# Patient Record
Sex: FEMALE | Race: WHITE | NOT HISPANIC OR LATINO
[De-identification: names, ages, dates, MRNs, and addresses within clinical notes are randomized per-mention and may not be internally consistent; named-entity substitution may affect disease eponyms.]

---

## 2017-01-24 ENCOUNTER — LABORATORY RESULT (OUTPATIENT)
Age: 52
End: 2017-01-24

## 2017-01-24 ENCOUNTER — APPOINTMENT (OUTPATIENT)
Dept: RHEUMATOLOGY | Facility: CLINIC | Age: 52
End: 2017-01-24

## 2017-01-24 VITALS
HEART RATE: 67 BPM | OXYGEN SATURATION: 98 % | BODY MASS INDEX: 21.17 KG/M2 | HEIGHT: 64 IN | WEIGHT: 124 LBS | DIASTOLIC BLOOD PRESSURE: 77 MMHG | SYSTOLIC BLOOD PRESSURE: 126 MMHG

## 2017-02-03 LAB
ALBUMIN SERPL ELPH-MCNC: 4.3 G/DL
ALP BLD-CCNC: 44 U/L
ALT SERPL-CCNC: 15 U/L
ANA PAT FLD IF-IMP: ABNORMAL
ANA SER IF-ACNC: ABNORMAL
ANION GAP SERPL CALC-SCNC: 15 MMOL/L
APPEARANCE: CLEAR
AST SERPL-CCNC: 15 U/L
B2 GLYCOPROT1 IGA SERPL IA-ACNC: <5 SAU
B2 GLYCOPROT1 IGG SER-ACNC: <5 SGU
B2 GLYCOPROT1 IGM SER-ACNC: <5 SMU
BACTERIA: ABNORMAL
BASOPHILS # BLD AUTO: 0.04 K/UL
BASOPHILS NFR BLD AUTO: 0.8 %
BILIRUB SERPL-MCNC: 0.3 MG/DL
BILIRUBIN URINE: NEGATIVE
BLOOD URINE: NEGATIVE
BUN SERPL-MCNC: 13 MG/DL
C3 SERPL-MCNC: 77 MG/DL
C4 SERPL-MCNC: 14 MG/DL
CALCIUM SERPL-MCNC: 9.6 MG/DL
CARDIOLIPIN AB SER IA-ACNC: NEGATIVE
CENTROMERE IGG SER-ACNC: 3.2 AL
CHLORIDE SERPL-SCNC: 103 MMOL/L
CO2 SERPL-SCNC: 24 MMOL/L
COLOR: YELLOW
CONFIRM: 25.1 SEC
CREAT SERPL-MCNC: 0.6 MG/DL
CREAT SPEC-SCNC: 28 MG/DL
CREAT/PROT UR: <0.1 RATIO
DEPRECATED CARDIOLIPIN IGA SER: <5 APL
DRVVT IMM 1:2 NP PPP: NORMAL
DRVVT SCREEN TO CONFIRM RATIO: 0.9 RATIO
DSDNA AB SER-ACNC: <12 IU/ML
ENA RNP AB SER IA-ACNC: 0.3 AL
ENA SCL70 IGG SER IA-ACNC: <0.2 AL
ENA SM AB SER IA-ACNC: <0.2 AL
ENA SS-A AB SER IA-ACNC: <0.2 AL
ENA SS-B AB SER IA-ACNC: <0.2 AL
EOSINOPHIL # BLD AUTO: 0.12 K/UL
EOSINOPHIL NFR BLD AUTO: 2.4 %
GLUCOSE QUALITATIVE U: NORMAL MG/DL
GLUCOSE SERPL-MCNC: 88 MG/DL
HCT VFR BLD CALC: 43.6 %
HGB BLD-MCNC: 13.6 G/DL
HYALINE CASTS: 1 /LPF
IMM GRANULOCYTES NFR BLD AUTO: 0.2 %
KETONES URINE: NEGATIVE
LEUKOCYTE ESTERASE URINE: NEGATIVE
LYMPHOCYTES # BLD AUTO: 1.93 K/UL
LYMPHOCYTES NFR BLD AUTO: 39.4 %
MAN DIFF?: NORMAL
MCHC RBC-ENTMCNC: 29.1 PG
MCHC RBC-ENTMCNC: 31.2 GM/DL
MCV RBC AUTO: 93.2 FL
MICROSCOPIC-UA: NORMAL
MONOCYTES # BLD AUTO: 0.54 K/UL
MONOCYTES NFR BLD AUTO: 11 %
NEUTROPHILS # BLD AUTO: 2.26 K/UL
NEUTROPHILS NFR BLD AUTO: 46.2 %
NITRITE URINE: NEGATIVE
PH URINE: 7
PLATELET # BLD AUTO: 276 K/UL
POTASSIUM SERPL-SCNC: 4.3 MMOL/L
PROT SERPL-MCNC: 6.8 G/DL
PROT UR-MCNC: <4 MG/DL
PROTEIN URINE: NEGATIVE MG/DL
RBC # BLD: 4.68 M/UL
RBC # FLD: 13.4 %
RED BLOOD CELLS URINE: 4 /HPF
RNA POLYMERASE III IGG: <10 U
SCREEN DRVVT: 26.3 SEC
SILICA CLOTTING TIME INTERPRETATION: NORMAL
SILICA CLOTTING TIME S/C: 1.02 RATIO
SODIUM SERPL-SCNC: 142 MMOL/L
SPECIFIC GRAVITY URINE: 1.01
SQUAMOUS EPITHELIAL CELLS: 5 /HPF
THYROGLOB AB SERPL-ACNC: <20 IU/ML
THYROPEROXIDASE AB SERPL IA-ACNC: <10 IU/ML
UROBILINOGEN URINE: NORMAL MG/DL
WBC # FLD AUTO: 4.9 K/UL
WHITE BLOOD CELLS URINE: 1 /HPF

## 2017-05-09 ENCOUNTER — TRANSCRIPTION ENCOUNTER (OUTPATIENT)
Age: 52
End: 2017-05-09

## 2017-07-17 ENCOUNTER — APPOINTMENT (OUTPATIENT)
Dept: DERMATOLOGY | Facility: CLINIC | Age: 52
End: 2017-07-17

## 2017-07-17 VITALS — HEIGHT: 64 IN | SYSTOLIC BLOOD PRESSURE: 108 MMHG | DIASTOLIC BLOOD PRESSURE: 60 MMHG

## 2017-07-17 DIAGNOSIS — I78.1 NEVUS, NON-NEOPLASTIC: ICD-10-CM

## 2017-07-17 DIAGNOSIS — Z87.442 PERSONAL HISTORY OF URINARY CALCULI: ICD-10-CM

## 2017-08-11 ENCOUNTER — APPOINTMENT (OUTPATIENT)
Dept: RHEUMATOLOGY | Facility: CLINIC | Age: 52
End: 2017-08-11
Payer: COMMERCIAL

## 2017-08-11 PROCEDURE — 99214 OFFICE O/P EST MOD 30 MIN: CPT

## 2017-08-16 ENCOUNTER — OUTPATIENT (OUTPATIENT)
Dept: OUTPATIENT SERVICES | Facility: HOSPITAL | Age: 52
LOS: 1 days | End: 2017-08-16
Payer: COMMERCIAL

## 2017-08-16 ENCOUNTER — APPOINTMENT (OUTPATIENT)
Dept: ULTRASOUND IMAGING | Facility: CLINIC | Age: 52
End: 2017-08-16
Payer: COMMERCIAL

## 2017-08-16 ENCOUNTER — APPOINTMENT (OUTPATIENT)
Dept: MAMMOGRAPHY | Facility: CLINIC | Age: 52
End: 2017-08-16
Payer: COMMERCIAL

## 2017-08-16 DIAGNOSIS — Z00.8 ENCOUNTER FOR OTHER GENERAL EXAMINATION: ICD-10-CM

## 2017-08-16 PROCEDURE — 76641 ULTRASOUND BREAST COMPLETE: CPT

## 2017-08-16 PROCEDURE — 76641 ULTRASOUND BREAST COMPLETE: CPT | Mod: 26,50

## 2017-08-16 PROCEDURE — G0202: CPT | Mod: 26

## 2017-08-16 PROCEDURE — 77067 SCR MAMMO BI INCL CAD: CPT

## 2017-08-16 PROCEDURE — 77063 BREAST TOMOSYNTHESIS BI: CPT | Mod: 26

## 2017-08-16 PROCEDURE — 77063 BREAST TOMOSYNTHESIS BI: CPT

## 2017-08-17 ENCOUNTER — TRANSCRIPTION ENCOUNTER (OUTPATIENT)
Age: 52
End: 2017-08-17

## 2017-08-17 LAB
25(OH)D3 SERPL-MCNC: 33.4 NG/ML
ALBUMIN SERPL ELPH-MCNC: 4.2 G/DL
ALP BLD-CCNC: 42 U/L
ALT SERPL-CCNC: 17 U/L
ANION GAP SERPL CALC-SCNC: 13 MMOL/L
APPEARANCE: CLEAR
AST SERPL-CCNC: 15 U/L
B2 GLYCOPROT1 AB SER QL: NEGATIVE
B2 GLYCOPROT1 IGA SERPL IA-ACNC: <5 SAU
BACTERIA: NEGATIVE
BASOPHILS # BLD AUTO: 0.04 K/UL
BASOPHILS NFR BLD AUTO: 0.4 %
BILIRUB SERPL-MCNC: 0.3 MG/DL
BILIRUBIN URINE: NEGATIVE
BLOOD URINE: NEGATIVE
BUN SERPL-MCNC: 10 MG/DL
C3 SERPL-MCNC: 80 MG/DL
C4 SERPL-MCNC: 16 MG/DL
CALCIUM SERPL-MCNC: 8.9 MG/DL
CARDIOLIPIN AB SER IA-ACNC: NEGATIVE
CCP AB SER IA-ACNC: <8 UNITS
CENTROMERE IGG SER-ACNC: 2.2 AL
CHLORIDE SERPL-SCNC: 103 MMOL/L
CO2 SERPL-SCNC: 26 MMOL/L
COLOR: YELLOW
CREAT SERPL-MCNC: 0.64 MG/DL
CREAT SPEC-SCNC: 55 MG/DL
CREAT/PROT UR: 0.1 RATIO
DSDNA AB SER-ACNC: <12 IU/ML
ENA RNP AB SER IA-ACNC: 0.3 AL
ENA SCL70 IGG SER IA-ACNC: <0.2 AL
ENA SM AB SER IA-ACNC: <0.2 AL
EOSINOPHIL # BLD AUTO: 0.25 K/UL
EOSINOPHIL NFR BLD AUTO: 2.6 %
ERYTHROCYTE [SEDIMENTATION RATE] IN BLOOD BY WESTERGREN METHOD: 3 MM/HR
G6PD SER-CCNC: 9.9 U/G HB
GLUCOSE QUALITATIVE U: NORMAL MG/DL
GLUCOSE SERPL-MCNC: 79 MG/DL
HCT VFR BLD CALC: 42.8 %
HGB BLD-MCNC: 13.4 G/DL
IMM GRANULOCYTES NFR BLD AUTO: 0.2 %
KETONES URINE: NEGATIVE
LEUKOCYTE ESTERASE URINE: NEGATIVE
LYMPHOCYTES # BLD AUTO: 2.36 K/UL
LYMPHOCYTES NFR BLD AUTO: 24.9 %
MAN DIFF?: NORMAL
MCHC RBC-ENTMCNC: 29.4 PG
MCHC RBC-ENTMCNC: 31.3 GM/DL
MCV RBC AUTO: 93.9 FL
MICROSCOPIC-UA: NORMAL
MONOCYTES # BLD AUTO: 0.89 K/UL
MONOCYTES NFR BLD AUTO: 9.4 %
NEUTROPHILS # BLD AUTO: 5.91 K/UL
NEUTROPHILS NFR BLD AUTO: 62.5 %
NITRITE URINE: NEGATIVE
PH URINE: 6
PLATELET # BLD AUTO: 260 K/UL
POTASSIUM SERPL-SCNC: 4.2 MMOL/L
PROT SERPL-MCNC: 6.7 G/DL
PROT UR-MCNC: 5 MG/DL
PROTEIN URINE: NEGATIVE MG/DL
RBC # BLD: 4.56 M/UL
RBC # FLD: 14.1 %
RED BLOOD CELLS URINE: 3 /HPF
RF+CCP IGG SER-IMP: NEGATIVE
RIBOSOMAL P AB SER IA-ACNC: <0.2 AL
RNA POLYMERASE III IGG: <10 U
SILICA CLOTTING TIME INTERPRETATION: NORMAL
SILICA CLOTTING TIME S/C: 0.93 RATIO
SODIUM SERPL-SCNC: 142 MMOL/L
SPECIFIC GRAVITY URINE: 1.01
SQUAMOUS EPITHELIAL CELLS: 3 /HPF
UROBILINOGEN URINE: NORMAL MG/DL
WBC # FLD AUTO: 9.47 K/UL
WHITE BLOOD CELLS URINE: 0 /HPF

## 2017-08-21 ENCOUNTER — RX RENEWAL (OUTPATIENT)
Age: 52
End: 2017-08-21

## 2017-08-23 DIAGNOSIS — Z12.31 ENCOUNTER FOR SCREENING MAMMOGRAM FOR MALIGNANT NEOPLASM OF BREAST: ICD-10-CM

## 2017-08-23 DIAGNOSIS — N60.12 DIFFUSE CYSTIC MASTOPATHY OF LEFT BREAST: ICD-10-CM

## 2017-08-23 DIAGNOSIS — N60.11 DIFFUSE CYSTIC MASTOPATHY OF RIGHT BREAST: ICD-10-CM

## 2017-10-13 ENCOUNTER — CLINICAL ADVICE (OUTPATIENT)
Age: 52
End: 2017-10-13

## 2017-11-20 ENCOUNTER — TRANSCRIPTION ENCOUNTER (OUTPATIENT)
Age: 52
End: 2017-11-20

## 2017-11-21 ENCOUNTER — TRANSCRIPTION ENCOUNTER (OUTPATIENT)
Age: 52
End: 2017-11-21

## 2017-11-22 ENCOUNTER — APPOINTMENT (OUTPATIENT)
Dept: RHEUMATOLOGY | Facility: CLINIC | Age: 52
End: 2017-11-22
Payer: COMMERCIAL

## 2017-11-22 DIAGNOSIS — R76.8 OTHER SPECIFIED ABNORMAL IMMUNOLOGICAL FINDINGS IN SERUM: ICD-10-CM

## 2017-11-22 PROCEDURE — 99214 OFFICE O/P EST MOD 30 MIN: CPT

## 2017-11-22 RX ORDER — HYDROXYCHLOROQUINE SULFATE 200 MG/1
200 TABLET, FILM COATED ORAL
Qty: 180 | Refills: 3 | Status: DISCONTINUED | COMMUNITY
Start: 2017-08-21 | End: 2017-11-22

## 2017-11-24 LAB
ALBUMIN SERPL ELPH-MCNC: 4.1 G/DL
ALP BLD-CCNC: 35 U/L
ALT SERPL-CCNC: 19 U/L
ANION GAP SERPL CALC-SCNC: 9 MMOL/L
AST SERPL-CCNC: 20 U/L
BASOPHILS # BLD AUTO: 0.04 K/UL
BASOPHILS NFR BLD AUTO: 0.6 %
BILIRUB SERPL-MCNC: 0.2 MG/DL
BUN SERPL-MCNC: 15 MG/DL
CALCIUM SERPL-MCNC: 9.1 MG/DL
CHLORIDE SERPL-SCNC: 103 MMOL/L
CK SERPL-CCNC: 78 U/L
CO2 SERPL-SCNC: 28 MMOL/L
CREAT SERPL-MCNC: 0.74 MG/DL
CRP SERPL-MCNC: <0.2 MG/DL
EOSINOPHIL # BLD AUTO: 0.17 K/UL
EOSINOPHIL NFR BLD AUTO: 2.6 %
ERYTHROCYTE [SEDIMENTATION RATE] IN BLOOD BY WESTERGREN METHOD: 3 MM/HR
GLUCOSE SERPL-MCNC: 94 MG/DL
HCT VFR BLD CALC: 39.5 %
HGB BLD-MCNC: 12.9 G/DL
IMM GRANULOCYTES NFR BLD AUTO: 0.3 %
LYMPHOCYTES # BLD AUTO: 2.3 K/UL
LYMPHOCYTES NFR BLD AUTO: 35.5 %
MAN DIFF?: NORMAL
MCHC RBC-ENTMCNC: 29.7 PG
MCHC RBC-ENTMCNC: 32.7 GM/DL
MCV RBC AUTO: 90.8 FL
MONOCYTES # BLD AUTO: 0.84 K/UL
MONOCYTES NFR BLD AUTO: 13 %
NEUTROPHILS # BLD AUTO: 3.11 K/UL
NEUTROPHILS NFR BLD AUTO: 48 %
PLATELET # BLD AUTO: 253 K/UL
POTASSIUM SERPL-SCNC: 4.4 MMOL/L
PROT SERPL-MCNC: 6.8 G/DL
RBC # BLD: 4.35 M/UL
RBC # FLD: 13.2 %
SODIUM SERPL-SCNC: 140 MMOL/L
WBC # FLD AUTO: 6.48 K/UL

## 2018-01-22 ENCOUNTER — APPOINTMENT (OUTPATIENT)
Dept: RHEUMATOLOGY | Facility: CLINIC | Age: 53
End: 2018-01-22

## 2018-07-23 ENCOUNTER — RESULT REVIEW (OUTPATIENT)
Age: 53
End: 2018-07-23

## 2018-08-22 ENCOUNTER — OUTPATIENT (OUTPATIENT)
Dept: OUTPATIENT SERVICES | Facility: HOSPITAL | Age: 53
LOS: 1 days | End: 2018-08-22
Payer: COMMERCIAL

## 2018-08-22 ENCOUNTER — APPOINTMENT (OUTPATIENT)
Dept: MAMMOGRAPHY | Facility: CLINIC | Age: 53
End: 2018-08-22
Payer: COMMERCIAL

## 2018-08-22 ENCOUNTER — APPOINTMENT (OUTPATIENT)
Dept: ULTRASOUND IMAGING | Facility: CLINIC | Age: 53
End: 2018-08-22
Payer: COMMERCIAL

## 2018-08-22 DIAGNOSIS — Z00.8 ENCOUNTER FOR OTHER GENERAL EXAMINATION: ICD-10-CM

## 2018-08-22 DIAGNOSIS — R92.2 INCONCLUSIVE MAMMOGRAM: ICD-10-CM

## 2018-08-22 PROCEDURE — 77063 BREAST TOMOSYNTHESIS BI: CPT | Mod: 26

## 2018-08-22 PROCEDURE — 76641 ULTRASOUND BREAST COMPLETE: CPT | Mod: 26,50

## 2018-08-22 PROCEDURE — 76641 ULTRASOUND BREAST COMPLETE: CPT

## 2018-08-22 PROCEDURE — 77067 SCR MAMMO BI INCL CAD: CPT

## 2018-08-22 PROCEDURE — 77063 BREAST TOMOSYNTHESIS BI: CPT

## 2018-08-22 PROCEDURE — 77067 SCR MAMMO BI INCL CAD: CPT | Mod: 26

## 2019-03-22 ENCOUNTER — APPOINTMENT (OUTPATIENT)
Dept: RADIOLOGY | Facility: CLINIC | Age: 54
End: 2019-03-22

## 2019-03-22 ENCOUNTER — APPOINTMENT (OUTPATIENT)
Dept: RADIOLOGY | Facility: CLINIC | Age: 54
End: 2019-03-22
Payer: COMMERCIAL

## 2019-03-22 ENCOUNTER — OUTPATIENT (OUTPATIENT)
Dept: OUTPATIENT SERVICES | Facility: HOSPITAL | Age: 54
LOS: 1 days | End: 2019-03-22
Payer: COMMERCIAL

## 2019-03-22 DIAGNOSIS — N20.0 CALCULUS OF KIDNEY: ICD-10-CM

## 2019-03-22 PROCEDURE — 74018 RADEX ABDOMEN 1 VIEW: CPT | Mod: 26

## 2019-03-22 PROCEDURE — 77080 DXA BONE DENSITY AXIAL: CPT

## 2019-03-22 PROCEDURE — 77080 DXA BONE DENSITY AXIAL: CPT | Mod: 26

## 2019-03-22 PROCEDURE — 74018 RADEX ABDOMEN 1 VIEW: CPT

## 2019-03-23 ENCOUNTER — TRANSCRIPTION ENCOUNTER (OUTPATIENT)
Age: 54
End: 2019-03-23

## 2019-08-14 ENCOUNTER — RESULT REVIEW (OUTPATIENT)
Age: 54
End: 2019-08-14

## 2019-09-06 ENCOUNTER — APPOINTMENT (OUTPATIENT)
Dept: MAMMOGRAPHY | Facility: CLINIC | Age: 54
End: 2019-09-06
Payer: COMMERCIAL

## 2019-09-06 ENCOUNTER — APPOINTMENT (OUTPATIENT)
Dept: ULTRASOUND IMAGING | Facility: CLINIC | Age: 54
End: 2019-09-06
Payer: COMMERCIAL

## 2019-09-06 ENCOUNTER — OUTPATIENT (OUTPATIENT)
Dept: OUTPATIENT SERVICES | Facility: HOSPITAL | Age: 54
LOS: 1 days | End: 2019-09-06
Payer: COMMERCIAL

## 2019-09-06 DIAGNOSIS — R92.2 INCONCLUSIVE MAMMOGRAM: ICD-10-CM

## 2019-09-06 PROCEDURE — 77063 BREAST TOMOSYNTHESIS BI: CPT | Mod: 26

## 2019-09-06 PROCEDURE — 76641 ULTRASOUND BREAST COMPLETE: CPT | Mod: 26,50

## 2019-09-06 PROCEDURE — 76641 ULTRASOUND BREAST COMPLETE: CPT

## 2019-09-06 PROCEDURE — 77067 SCR MAMMO BI INCL CAD: CPT | Mod: 26

## 2019-09-06 PROCEDURE — 77067 SCR MAMMO BI INCL CAD: CPT

## 2019-09-06 PROCEDURE — 77063 BREAST TOMOSYNTHESIS BI: CPT

## 2019-09-16 ENCOUNTER — APPOINTMENT (OUTPATIENT)
Dept: ULTRASOUND IMAGING | Facility: CLINIC | Age: 54
End: 2019-09-16
Payer: COMMERCIAL

## 2019-09-16 ENCOUNTER — OUTPATIENT (OUTPATIENT)
Dept: OUTPATIENT SERVICES | Facility: HOSPITAL | Age: 54
LOS: 1 days | End: 2019-09-16
Payer: COMMERCIAL

## 2019-09-16 DIAGNOSIS — Z00.8 ENCOUNTER FOR OTHER GENERAL EXAMINATION: ICD-10-CM

## 2019-09-16 PROCEDURE — 76856 US EXAM PELVIC COMPLETE: CPT

## 2019-09-16 PROCEDURE — 76856 US EXAM PELVIC COMPLETE: CPT | Mod: 26

## 2020-01-13 ENCOUNTER — OUTPATIENT (OUTPATIENT)
Dept: OUTPATIENT SERVICES | Facility: HOSPITAL | Age: 55
LOS: 1 days | End: 2020-01-13
Payer: COMMERCIAL

## 2020-01-13 ENCOUNTER — APPOINTMENT (OUTPATIENT)
Dept: MRI IMAGING | Facility: CLINIC | Age: 55
End: 2020-01-13
Payer: COMMERCIAL

## 2020-01-13 DIAGNOSIS — Z00.8 ENCOUNTER FOR OTHER GENERAL EXAMINATION: ICD-10-CM

## 2020-01-13 PROCEDURE — 73721 MRI JNT OF LWR EXTRE W/O DYE: CPT

## 2020-01-13 PROCEDURE — 73721 MRI JNT OF LWR EXTRE W/O DYE: CPT | Mod: 26,RT

## 2020-04-25 ENCOUNTER — MESSAGE (OUTPATIENT)
Age: 55
End: 2020-04-25

## 2020-04-27 ENCOUNTER — OUTPATIENT (OUTPATIENT)
Dept: OUTPATIENT SERVICES | Facility: HOSPITAL | Age: 55
LOS: 1 days | End: 2020-04-27
Payer: COMMERCIAL

## 2020-04-27 ENCOUNTER — APPOINTMENT (OUTPATIENT)
Dept: MRI IMAGING | Facility: CLINIC | Age: 55
End: 2020-04-27
Payer: COMMERCIAL

## 2020-04-27 DIAGNOSIS — Z00.8 ENCOUNTER FOR OTHER GENERAL EXAMINATION: ICD-10-CM

## 2020-04-27 PROCEDURE — 72148 MRI LUMBAR SPINE W/O DYE: CPT | Mod: 26

## 2020-04-27 PROCEDURE — 72148 MRI LUMBAR SPINE W/O DYE: CPT

## 2020-05-05 ENCOUNTER — APPOINTMENT (OUTPATIENT)
Dept: DISASTER EMERGENCY | Facility: CLINIC | Age: 55
End: 2020-05-05

## 2020-05-14 LAB
SARS-COV-2 IGG SERPL IA-ACNC: 0 INDEX
SARS-COV-2 IGG SERPL QL IA: NEGATIVE

## 2020-06-02 ENCOUNTER — APPOINTMENT (OUTPATIENT)
Dept: ORTHOPEDIC SURGERY | Facility: CLINIC | Age: 55
End: 2020-06-02
Payer: COMMERCIAL

## 2020-06-02 PROCEDURE — 99204 OFFICE O/P NEW MOD 45 MIN: CPT

## 2020-06-02 PROCEDURE — 73562 X-RAY EXAM OF KNEE 3: CPT | Mod: RT

## 2020-07-02 ENCOUNTER — APPOINTMENT (OUTPATIENT)
Dept: ORTHOPEDIC SURGERY | Facility: CLINIC | Age: 55
End: 2020-07-02
Payer: COMMERCIAL

## 2020-07-02 VITALS
OXYGEN SATURATION: 98 % | SYSTOLIC BLOOD PRESSURE: 147 MMHG | HEART RATE: 70 BPM | DIASTOLIC BLOOD PRESSURE: 80 MMHG | WEIGHT: 115 LBS | BODY MASS INDEX: 19.63 KG/M2 | HEIGHT: 64 IN

## 2020-07-02 PROCEDURE — 99215 OFFICE O/P EST HI 40 MIN: CPT

## 2020-07-02 RX ORDER — DICLOFENAC SODIUM 75 MG/1
75 TABLET, DELAYED RELEASE ORAL
Qty: 60 | Refills: 0 | Status: COMPLETED | COMMUNITY
Start: 2017-11-22 | End: 2018-07-02

## 2020-07-02 NOTE — HISTORY OF PRESENT ILLNESS
[de-identified] : Ms. Garces is a 55-year-old woman who presents for her RIGHT lower leg.\par She first had a problem December 12, 2019. She was on the last day of the 12 day fitness class and did some exercises and then was running on the treadmill and about 15 minutes into the run she was tripping over her RIGHT foot. It was weak. There was no fall or injury her pain but didn't work RIGHT. She's had issues ever since. There was never any numbness or tingling. She couldn't walk RIGHT after that. She saw  about a month later and had x-rays of her RIGHT knee and low back which were unremarkable. She still wasn't getting better and had an MRI of her knee which did not show anything significant. A second MRI showed some meniscus degeneration but no real tears. She had an ultrasound that showed thickening of the RIGHT peroneal nerve around the fibular neck compared to the LEFT peroneal nerve. She saw a neurologist and had MRIs of her brain and has had nerve studies. One nerve study suggested a RIGHT L4-L5 radiculopathy but an MRI of the lumbar spine showed no pathology of any significance.She is scheduled to have new EMG studies done in a few weeks from now. She is also going to see a neurologist next week. Someone suggested testing for compartment syndrome. She states that the weakness in the leg occurs immediately as she walks or is climbing stairs. As she walks she needs to lift her leg or swelling it out to the side because otherwise she starts to trip over her toes on the RIGHT side. 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 She did do physical therapy 5 weeks. It didn't make any difference. She has been taking naproxen as an anti-inflammatory.

## 2020-07-02 NOTE — ASSESSMENT
[FreeTextEntry1] : 55-year-old woman with a family history of scleroderma and myalgias in the past to more recently is having difficulty walking with with what appears clinically to be a RIGHT drop foot that occurs as she walks. Her exam is really not consistent with exertional compartment syndrome because the compartments are soft and this occurs immediately without the typical compartment painI would recommend getting an MRI of the lower leg and the foot to see if there is any denervation and atrophy in the muscles given that this has been going on now for 7 months. Other testing has not pointed to the etiology. She is going to have repeat nerve studies done in a few weeks which may help. If it's still not clear we could do exertional compartment testing but I think the Likelihood of it being exertional compartment syndrome is very low.She will see a neurologist as well.\par I will call her with the MRI results. In the meantime she could get a brace for the foot drop either an AFO worse something that ties into Shoelaces to help hold her foot up and so she's not compensating as much and causing other issues.\par I don't feel the etiology has anything to do with what going on in the knee joint itself.

## 2020-07-02 NOTE — PHYSICAL EXAM
[Slightly Antalgic] : slightly antalgic [DP] : dorsalis pedis 2+ and symmetric bilaterally [LE] : Sensory: Intact in bilateral lower extremities [Normal RLE] : Right Lower Extremity: No scars, rashes, lesions, ulcers, skin intact [PT] : posterior tibial 2+ and symmetric bilaterally [Normal Touch] : sensation intact for touch [Normal LLE] : Left Lower Extremity: No scars, rashes, lesions, ulcers, skin intact [Normal] : Oriented to person, place, and time, insight and judgement were intact and the affect was normal [de-identified] : Lower extremities\par When she is walking down the hallway she starts to drop her RIGHT Ankle and toes and plantar flexion and have to lift and swing the RIGHT leg. She can walk on her heels and toes.\par There appears to be some general atrophy in the RIGHT lower leg and foot.\par No edema, ecchymoses, erythema, deformity lower extremities.\par Intact knee range of motion. Nontender around the RIGHT knee. Negative Tinel's over the fibular neck/peroneal tendon\par Sensation is intact bilateral lower extremities.\par Compartments are soft.\par Intact dorsalis pedis and posterior tibial pulses bilaterally symmetric.\par 5/5 anterior tibial tendon, gastrocsoleus, peroneals, posterior tibial tendon and toe extensors bilaterally.\par She does have repetitive ankle dorsiflexion on the RIGHT side she feels aching and fatigue in the RIGHT leg. The compartments remain soft. [de-identified] : No respiratory distress or coughing [de-identified] : \par MRI  of the RIGHT knee performed May 27, 2020 showed mild loss of articular cartilage throughout the medial and lateral compartments and mucoid change in the posterior horn of the medial meniscus\par I wasn't able to review that MRI. I was able to see the MRI from January 2020.\par \par MRI lumbar spine is unremarkable.

## 2020-07-06 ENCOUNTER — TRANSCRIPTION ENCOUNTER (OUTPATIENT)
Age: 55
End: 2020-07-06

## 2020-07-13 ENCOUNTER — APPOINTMENT (OUTPATIENT)
Dept: MRI IMAGING | Facility: CLINIC | Age: 55
End: 2020-07-13
Payer: COMMERCIAL

## 2020-07-13 ENCOUNTER — OUTPATIENT (OUTPATIENT)
Dept: OUTPATIENT SERVICES | Facility: HOSPITAL | Age: 55
LOS: 1 days | End: 2020-07-13
Payer: COMMERCIAL

## 2020-07-13 ENCOUNTER — RESULT REVIEW (OUTPATIENT)
Age: 55
End: 2020-07-13

## 2020-07-13 DIAGNOSIS — M62.561 MUSCLE WASTING AND ATROPHY, NOT ELSEWHERE CLASSIFIED, RIGHT LOWER LEG: ICD-10-CM

## 2020-07-13 PROCEDURE — 73718 MRI LOWER EXTREMITY W/O DYE: CPT | Mod: 26,RT

## 2020-07-13 PROCEDURE — 73718 MRI LOWER EXTREMITY W/O DYE: CPT | Mod: 26,RT,76

## 2020-07-13 PROCEDURE — 73718 MRI LOWER EXTREMITY W/O DYE: CPT

## 2020-07-31 ENCOUNTER — APPOINTMENT (OUTPATIENT)
Dept: ORTHOPEDIC SURGERY | Facility: CLINIC | Age: 55
End: 2020-07-31
Payer: COMMERCIAL

## 2020-07-31 VITALS
WEIGHT: 115 LBS | DIASTOLIC BLOOD PRESSURE: 77 MMHG | TEMPERATURE: 98.4 F | BODY MASS INDEX: 19.63 KG/M2 | HEART RATE: 79 BPM | SYSTOLIC BLOOD PRESSURE: 121 MMHG | HEIGHT: 64 IN

## 2020-07-31 PROCEDURE — 99214 OFFICE O/P EST MOD 30 MIN: CPT

## 2020-07-31 PROCEDURE — 99203 OFFICE O/P NEW LOW 30 MIN: CPT

## 2020-08-26 ENCOUNTER — APPOINTMENT (OUTPATIENT)
Dept: NEUROLOGY | Facility: CLINIC | Age: 55
End: 2020-08-26
Payer: COMMERCIAL

## 2020-08-26 VITALS
DIASTOLIC BLOOD PRESSURE: 78 MMHG | BODY MASS INDEX: 20.49 KG/M2 | WEIGHT: 120 LBS | HEART RATE: 62 BPM | SYSTOLIC BLOOD PRESSURE: 126 MMHG | HEIGHT: 64 IN

## 2020-08-26 DIAGNOSIS — Z87.39 PERSONAL HISTORY OF OTHER DISEASES OF THE MUSCULOSKELETAL SYSTEM AND CONNECTIVE TISSUE: ICD-10-CM

## 2020-08-26 PROCEDURE — 99205 OFFICE O/P NEW HI 60 MIN: CPT

## 2020-08-26 RX ORDER — NAPROXEN 500 MG/1
500 TABLET ORAL
Refills: 0 | Status: DISCONTINUED | COMMUNITY
End: 2020-08-26

## 2020-08-26 NOTE — PHYSICAL EXAM
[Person] : oriented to person [Place] : oriented to place [Time] : oriented to time [Registration Intact] : recent registration memory intact [Naming Objects] : no difficulty naming common objects [Fluency] : fluency intact [Motor Strength] : muscle strength was normal in all four extremities [Motor Handedness Right-Handed] : the patient is right hand dominant [Short Term Intact] : short term memory intact [Repeating Phrases] : no difficulty repeating a phrase [Comprehension] : comprehension intact [Cranial Nerves Optic (II)] : visual acuity intact bilaterally,  visual fields full to confrontation, pupils equal round and reactive to light [Cranial Nerves Oculomotor (III)] : extraocular motion intact [Cranial Nerves Trigeminal (V)] : facial sensation intact symmetrically [Cranial Nerves Facial (VII)] : face symmetrical [Cranial Nerves Vestibulocochlear (VIII)] : hearing was intact bilaterally [Cranial Nerves Glossopharyngeal (IX)] : tongue and palate midline [Cranial Nerves Accessory (XI - Cranial And Spinal)] : head turning and shoulder shrug symmetric [Cranial Nerves Hypoglossal (XII)] : there was no tongue deviation with protrusion [No Muscle Atrophy] : normal bulk in all four extremities [Sensation Tactile Decrease] : light touch was intact [Sensation Pain / Temperature Decrease] : pain and temperature was intact [Sensation Vibration Decrease] : vibration was intact [2+] : Ankle jerk left 2+ [Paresis Pronator Drift Right-Sided] : no pronator drift on the right [Paresis Pronator Drift Left-Sided] : no pronator drift on the left [Plantar Reflex Right Only] : normal on the right [Plantar Reflex Left Only] : normal on the left [FreeTextEntry1] : Facial expression and volume of speech were normal. Extraocular movements were intact with normal saccade, smooth pursuit, no square wave jerks seen. Tone was normal throughout. Rapid alternating hand movements and foot tapping were normal bilaterally. She did get up from the chair without using her arms. Gait was normal based and steady. She had a mild, intermittent side-to-side head tremor with no abnormal head posturing.\par During walking, her right foot showed frequent plantar flexion and inversion, which could worsen longer she walked. Occasionally she seemed to have eversion and dorsiflexion, though this appeared compensatory. When walking stairs the inversion worsened going up the stairs, and the plantarflexion worsened going down the stairs. Walking backwards improved but did not resolve the foot movement. There were no other abnormal movements that I could observe.\par

## 2020-08-26 NOTE — DISCUSSION/SUMMARY
[FreeTextEntry1] : In summary, the patient is a 55-year-old right-handed woman with a nine-month history of sudden onset abnormal movements of the right foot. The examination was significant for right foot inversion and plantarflexion doing walking and stair climbing, with occasional compensatory eversion movements. 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 The sudden onset is a little unusual, in the absence of injury, but she was doing intensive workouts at that time which can occasionally bring out dystonia. It has not significantly progressed since onset. There were no other findings on examination would point to a more widespread neurologic disorder. She did already undergo a trial of levodopa, which is appropriate given the symptom distribution, but this did not help, making DRD unlikely. She has no family history that would point to a genetic generalized dystonia.\par \par He discussed treatment options for focal dystonias. While anticholinergic such as Artane can help, they usually require higher doses and are poorly tolerated in adults. Thus, as a first-line I did recommend a trial of botulinum toxin. This would involve injections into the tibialis posterior, possibly adding other muscles depending on the results.\par \par I spent approximately 60 minutes with the patient, examining and discussing the above findings and impression. Followup will be in 2-3 weeks for the injections.

## 2020-08-26 NOTE — HISTORY OF PRESENT ILLNESS
[FreeTextEntry1] : The patient is a 55-year-old right-handed woman, who works as an nurse at [employer], and who developed abnormal movements of the right foot about 9 months ago. She was doing a 21 day fitness challenge in one day started tripping over her right foot on the treadmill. This was fairly sudden onset, with no preceding illness, trauma, or pain. She notices that her foot plantar flexes and pulls to the inside when she is walking, especially when she is tired. This is worsened when she walks up stairs. When she walks downstairs she mostly noticed the plantarflexion. Walking backwards improves her symptoms.\par \par She was evaluated by several orthopedics, and eventually neurologists. One EMG suggested a possible L4-5 radiculopathy, though an MRI of the back was unremarkable. A second neurologist did not notice any foot drop, and they performed an ultrasound, showing "inflammation of the peroneal nerve". Another EMG was performed which did not show any nerve damage. That neurologist, Dr. Kane Bradshaw diagnosed her with dystonia, and gave her a trial of carbidopa/levodopa, 25/100, 3 times a day. This caused significant nausea but did not provide any benefit. An MRI of the brain with and without contrast was unremarkable.\par \par She has no other abnormal movements anywhere, except for a long-standing history of a mild head tremor, for which she received botulinum toxin in the past. She has no weakness, numbness, speech changes, visual changes, or issues with balance. There is no family history of neurologic disease. 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

## 2020-08-28 ENCOUNTER — APPOINTMENT (OUTPATIENT)
Dept: NEUROLOGY | Facility: CLINIC | Age: 55
End: 2020-08-28

## 2020-09-11 ENCOUNTER — APPOINTMENT (OUTPATIENT)
Dept: NEUROLOGY | Facility: CLINIC | Age: 55
End: 2020-09-11
Payer: COMMERCIAL

## 2020-09-11 VITALS
SYSTOLIC BLOOD PRESSURE: 132 MMHG | DIASTOLIC BLOOD PRESSURE: 82 MMHG | WEIGHT: 120 LBS | HEART RATE: 66 BPM | HEIGHT: 64 IN | BODY MASS INDEX: 20.49 KG/M2

## 2020-09-11 VITALS — TEMPERATURE: 97.6 F

## 2020-09-11 PROCEDURE — 64642 CHEMODENERV 1 EXTREMITY 1-4: CPT

## 2020-09-11 PROCEDURE — 99213 OFFICE O/P EST LOW 20 MIN: CPT | Mod: 25

## 2020-09-11 PROCEDURE — 95874 GUIDE NERV DESTR NEEDLE EMG: CPT

## 2020-09-11 NOTE — DISCUSSION/SUMMARY
[FreeTextEntry1] : In summary, the patient is a 55-year-old right-handed woman with a nine-month history of sudden onset abnormal movements of the right foot. The examination was significant for right foot inversion and plantarflexion doing walking and stair climbing, with occasional compensatory eversion movements. 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 The sudden onset is a little unusual, in the absence of injury, but she was doing intensive workouts at that time which can occasionally bring out dystonia. It has not significantly progressed since onset. There were no other findings on examination would point to a more widespread neurologic disorder. She did already undergo a trial of levodopa, which is appropriate given the symptom distribution, but this did not help, making DRD unlikely. She has no family history that would point to a genetic generalized dystonia.\par \par 1. Injected per procedure note, will repeat and adjust if needed in 3 months\par \par We discussed the above impression, plan and recommendations during the visit. Counseling represented more then 50% of the 20 minute visit time\par

## 2020-09-11 NOTE — HISTORY OF PRESENT ILLNESS
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 She was doing a 21 day fitness challenge in one day started tripping over her right foot on the treadmill. This was fairly sudden onset, with no preceding illness, trauma, or pain. She notices that her foot plantar flexes and pulls to the inside when she is walking, especially when she is tired. This is worsened when she walks up stairs. When she walks downstairs she mostly noticed the plantarflexion. Walking backwards improves her symptoms.\par \par She was evaluated by several orthopedics, and eventually neurologists. One EMG suggested a possible L4-5 radiculopathy, though an MRI of the back was unremarkable. A second neurologist did not notice any foot drop, and they performed an ultrasound, showing "inflammation of the peroneal nerve". Another EMG was performed which did not show any nerve damage. That neurologist, Dr. Kane Bradshaw diagnosed her with dystonia, and gave her a trial of carbidopa/levodopa, 25/100, 3 times a day. This caused significant nausea but did not provide any benefit. An MRI of the brain with and without contrast was unremarkable.\par \par She has no other abnormal movements anywhere, except for a long-standing history of a mild head tremor, for which she received botulinum toxin in the past. She has no weakness, numbness, speech changes, visual changes, or issues with balance. There is no family history of neurologic disease. 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

## 2020-09-11 NOTE — PROCEDURE
[FreeTextEntry1] : The patient received injections with botulinum toxin A (botox), diluted at 5 units/0.1 cc via a 30 ga needle into the following sites, in the usual antiseptic manner. Possible side effects including over-weakening, dysphagia, bleeding, and local infection were discussed.\par \par 1. Right Tibialis posterior 50/2\par \par Total injected 50 units, waste 50  units, total used 0 units \par EMG used for localization\par The patient tolerated the procedure well, with not complications\par \par

## 2020-09-11 NOTE — PHYSICAL EXAM
[Person] : oriented to person [Place] : oriented to place [Time] : oriented to time [Naming Objects] : no difficulty naming common objects [Registration Intact] : recent registration memory intact [Short Term Intact] : short term memory intact [Fluency] : fluency intact [Repeating Phrases] : no difficulty repeating a phrase [Cranial Nerves Oculomotor (III)] : extraocular motion intact [Comprehension] : comprehension intact [Cranial Nerves Trigeminal (V)] : facial sensation intact symmetrically [Cranial Nerves Optic (II)] : visual acuity intact bilaterally,  visual fields full to confrontation, pupils equal round and reactive to light [Cranial Nerves Vestibulocochlear (VIII)] : hearing was intact bilaterally [Cranial Nerves Glossopharyngeal (IX)] : tongue and palate midline [Cranial Nerves Facial (VII)] : face symmetrical [Motor Strength] : muscle strength was normal in all four extremities [Cranial Nerves Accessory (XI - Cranial And Spinal)] : head turning and shoulder shrug symmetric [Cranial Nerves Hypoglossal (XII)] : there was no tongue deviation with protrusion [No Muscle Atrophy] : normal bulk in all four extremities [Motor Handedness Right-Handed] : the patient is right hand dominant [Sensation Tactile Decrease] : light touch was intact [Sensation Vibration Decrease] : vibration was intact [Sensation Pain / Temperature Decrease] : pain and temperature was intact [2+] : Ankle jerk left 2+ [Paresis Pronator Drift Left-Sided] : no pronator drift on the left [Paresis Pronator Drift Right-Sided] : no pronator drift on the right [Plantar Reflex Right Only] : normal on the right [Plantar Reflex Left Only] : normal on the left [FreeTextEntry1] : Facial expression and volume of speech were normal. Extraocular movements were intact with normal saccade, smooth pursuit, no square wave jerks seen. Tone was normal throughout. Rapid alternating hand movements and foot tapping were normal bilaterally. She did get up from the chair without using her arms. Gait was normal based and steady. She had a mild, intermittent side-to-side head tremor with no abnormal head posturing.\par \par During walking, her right foot showed frequent plantar flexion and inversion, which could worsen longer she walked. Occasionally she seemed to have eversion and dorsiflexion, though this appeared compensatory. When walking stairs the inversion worsened going up the stairs, and the plantarflexion worsened going down the stairs. Walking backwards improved but did not resolve the foot movement. There were no other abnormal movements that I could observe.\par

## 2020-09-17 ENCOUNTER — APPOINTMENT (OUTPATIENT)
Dept: NEUROLOGY | Facility: CLINIC | Age: 55
End: 2020-09-17

## 2020-11-05 ENCOUNTER — TRANSCRIPTION ENCOUNTER (OUTPATIENT)
Age: 55
End: 2020-11-05

## 2020-11-16 ENCOUNTER — OUTPATIENT (OUTPATIENT)
Dept: OUTPATIENT SERVICES | Facility: HOSPITAL | Age: 55
LOS: 1 days | End: 2020-11-16
Payer: COMMERCIAL

## 2020-11-16 VITALS
RESPIRATION RATE: 16 BRPM | TEMPERATURE: 97 F | WEIGHT: 121.92 LBS | OXYGEN SATURATION: 100 % | DIASTOLIC BLOOD PRESSURE: 70 MMHG | SYSTOLIC BLOOD PRESSURE: 122 MMHG | HEIGHT: 64 IN | HEART RATE: 78 BPM

## 2020-11-16 DIAGNOSIS — Z87.39 PERSONAL HISTORY OF OTHER DISEASES OF THE MUSCULOSKELETAL SYSTEM AND CONNECTIVE TISSUE: Chronic | ICD-10-CM

## 2020-11-16 DIAGNOSIS — Z98.890 OTHER SPECIFIED POSTPROCEDURAL STATES: Chronic | ICD-10-CM

## 2020-11-16 DIAGNOSIS — M79.A21 NONTRAUMATIC COMPARTMENT SYNDROME OF RIGHT LOWER EXTREMITY: ICD-10-CM

## 2020-11-16 DIAGNOSIS — Z01.818 ENCOUNTER FOR OTHER PREPROCEDURAL EXAMINATION: ICD-10-CM

## 2020-11-16 DIAGNOSIS — S84.10XA INJURY OF PERONEAL NERVE AT LOWER LEG LEVEL, UNSPECIFIED LEG, INITIAL ENCOUNTER: ICD-10-CM

## 2020-11-16 DIAGNOSIS — N93.9 ABNORMAL UTERINE AND VAGINAL BLEEDING, UNSPECIFIED: Chronic | ICD-10-CM

## 2020-11-16 LAB
HCT VFR BLD CALC: 42.3 % — SIGNIFICANT CHANGE UP (ref 34.5–45)
HGB BLD-MCNC: 13.3 G/DL — SIGNIFICANT CHANGE UP (ref 11.5–15.5)
MCHC RBC-ENTMCNC: 28.8 PG — SIGNIFICANT CHANGE UP (ref 27–34)
MCHC RBC-ENTMCNC: 31.4 GM/DL — LOW (ref 32–36)
MCV RBC AUTO: 91.6 FL — SIGNIFICANT CHANGE UP (ref 80–100)
NRBC # BLD: 0 /100 WBCS — SIGNIFICANT CHANGE UP (ref 0–0)
PLATELET # BLD AUTO: 326 K/UL — SIGNIFICANT CHANGE UP (ref 150–400)
RBC # BLD: 4.62 M/UL — SIGNIFICANT CHANGE UP (ref 3.8–5.2)
RBC # FLD: 12.8 % — SIGNIFICANT CHANGE UP (ref 10.3–14.5)
WBC # BLD: 7.18 K/UL — SIGNIFICANT CHANGE UP (ref 3.8–10.5)
WBC # FLD AUTO: 7.18 K/UL — SIGNIFICANT CHANGE UP (ref 3.8–10.5)

## 2020-11-16 PROCEDURE — 85027 COMPLETE CBC AUTOMATED: CPT

## 2020-11-16 PROCEDURE — U0003: CPT

## 2020-11-16 PROCEDURE — G0463: CPT

## 2020-11-16 RX ORDER — CEFAZOLIN SODIUM 1 G
2000 VIAL (EA) INJECTION ONCE
Refills: 0 | Status: DISCONTINUED | OUTPATIENT
Start: 2020-11-19 | End: 2020-12-03

## 2020-11-16 NOTE — H&P PST ADULT - NSICDXPASTSURGICALHX_GEN_ALL_CORE_FT
PAST SURGICAL HISTORY:  History of lumpectomy right benign     PAST SURGICAL HISTORY:  H/O ganglion cyst     History of lumpectomy right breast, benign    Uterine bleeding h/o novasure treatment done

## 2020-11-16 NOTE — H&P PST ADULT - ACTIVITY
used gym 5xx/week before this injury. home chores used to go for gym 5x/week before this leg injury. 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

## 2020-11-16 NOTE — H&P PST ADULT - NSANTHOSAYNRD_GEN_A_CORE
No. ZANA screening performed.  STOP BANG Legend: 0-2 = LOW Risk; 3-4 = INTERMEDIATE Risk; 5-8 = HIGH Risk

## 2020-11-16 NOTE — H&P PST ADULT - NSICDXPROBLEM_GEN_ALL_CORE_FT
PROBLEM DIAGNOSES  Problem: Peroneal nerve injury  Assessment and Plan:  scheduled right peroneal nerve decompression surgery on 11/19/2020.  ****Preop covid testing done in [facility] on 11/16/2020.  preop [facility]

## 2020-11-16 NOTE — H&P PST ADULT - HISTORY OF PRESENT ILLNESS
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 Patient states she was doing a 21 day fitness challenge & in one day started tripping over her right foot on the treadmill. This was fairly sudden onset, with no preceding illness, trauma, or pain. She notices that her foot plantar flexes and pulls to the inside when she is walking, especially when she is tired. This is worsened when she walks up stairs. When she walks downstairs she mostly noticed the plantarflexion. 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    ****Preop covid testing done in [facility] on 11/16/2020.

## 2020-11-16 NOTE — H&P PST ADULT - HEALTH CARE MAINTENANCE
Flu vaccine in 2019  On yearly Annual physical  Last colonosccopy up to date Flu vaccine in 2019  On yearly Annual physical  Last colonoscopy up to date"

## 2020-11-17 LAB — SARS-COV-2 RNA SPEC QL NAA+PROBE: SIGNIFICANT CHANGE UP

## 2020-11-18 ENCOUNTER — TRANSCRIPTION ENCOUNTER (OUTPATIENT)
Age: 55
End: 2020-11-18

## 2020-11-19 ENCOUNTER — OUTPATIENT (OUTPATIENT)
Dept: OUTPATIENT SERVICES | Facility: HOSPITAL | Age: 55
LOS: 1 days | End: 2020-11-19
Payer: COMMERCIAL

## 2020-11-19 VITALS
WEIGHT: 121.92 LBS | DIASTOLIC BLOOD PRESSURE: 82 MMHG | TEMPERATURE: 99 F | SYSTOLIC BLOOD PRESSURE: 126 MMHG | RESPIRATION RATE: 18 BRPM | HEART RATE: 81 BPM | HEIGHT: 64 IN | OXYGEN SATURATION: 100 %

## 2020-11-19 VITALS
RESPIRATION RATE: 16 BRPM | SYSTOLIC BLOOD PRESSURE: 113 MMHG | HEART RATE: 73 BPM | OXYGEN SATURATION: 96 % | DIASTOLIC BLOOD PRESSURE: 63 MMHG

## 2020-11-19 DIAGNOSIS — N93.9 ABNORMAL UTERINE AND VAGINAL BLEEDING, UNSPECIFIED: Chronic | ICD-10-CM

## 2020-11-19 DIAGNOSIS — Z87.39 PERSONAL HISTORY OF OTHER DISEASES OF THE MUSCULOSKELETAL SYSTEM AND CONNECTIVE TISSUE: Chronic | ICD-10-CM

## 2020-11-19 DIAGNOSIS — M79.A21 NONTRAUMATIC COMPARTMENT SYNDROME OF RIGHT LOWER EXTREMITY: ICD-10-CM

## 2020-11-19 DIAGNOSIS — Z98.890 OTHER SPECIFIED POSTPROCEDURAL STATES: Chronic | ICD-10-CM

## 2020-11-19 LAB — HCG UR QL: NEGATIVE — SIGNIFICANT CHANGE UP

## 2020-11-19 PROCEDURE — 64708 REVISE ARM/LEG NERVE: CPT

## 2020-11-19 PROCEDURE — 81025 URINE PREGNANCY TEST: CPT

## 2020-11-19 RX ORDER — FENTANYL CITRATE 50 UG/ML
25 INJECTION INTRAVENOUS
Refills: 0 | Status: DISCONTINUED | OUTPATIENT
Start: 2020-11-19 | End: 2020-11-19

## 2020-11-19 RX ORDER — OXYCODONE HYDROCHLORIDE 5 MG/1
5 TABLET ORAL ONCE
Refills: 0 | Status: DISCONTINUED | OUTPATIENT
Start: 2020-11-19 | End: 2020-11-19

## 2020-11-19 RX ORDER — LIDOCAINE HCL 20 MG/ML
0.2 VIAL (ML) INJECTION ONCE
Refills: 0 | Status: DISCONTINUED | OUTPATIENT
Start: 2020-11-19 | End: 2020-11-19

## 2020-11-19 RX ORDER — SODIUM CHLORIDE 9 MG/ML
3 INJECTION INTRAMUSCULAR; INTRAVENOUS; SUBCUTANEOUS EVERY 8 HOURS
Refills: 0 | Status: DISCONTINUED | OUTPATIENT
Start: 2020-11-19 | End: 2020-11-19

## 2020-11-19 RX ORDER — CHLORHEXIDINE GLUCONATE 213 G/1000ML
1 SOLUTION TOPICAL ONCE
Refills: 0 | Status: DISCONTINUED | OUTPATIENT
Start: 2020-11-19 | End: 2020-11-19

## 2020-11-19 RX ORDER — ONDANSETRON 8 MG/1
4 TABLET, FILM COATED ORAL ONCE
Refills: 0 | Status: DISCONTINUED | OUTPATIENT
Start: 2020-11-19 | End: 2020-11-19

## 2020-11-19 RX ADMIN — OXYCODONE HYDROCHLORIDE 5 MILLIGRAM(S): 5 TABLET ORAL at 09:40

## 2020-11-19 RX ADMIN — OXYCODONE HYDROCHLORIDE 5 MILLIGRAM(S): 5 TABLET ORAL at 09:10

## 2020-11-19 NOTE — ASU DISCHARGE PLAN (ADULT/PEDIATRIC) - ASU DC SPECIAL INSTRUCTIONSFT
Keep left knee dressing clean dry and intact  weight bearing as tolerated  avoid overactivity/sports until incision has healed  pain medication sent to pharmacy  Follow up in 10-14 days with Dr. Osborn Keep right knee dressing clean dry and intact  weight bearing as tolerated  avoid overactivity/sports until incision has healed  pain medication sent to pharmacy  Follow up in 10-14 days with Dr. Osborn

## 2021-01-18 ENCOUNTER — RESULT REVIEW (OUTPATIENT)
Age: 56
End: 2021-01-18

## 2021-01-29 PROBLEM — M79.A29: Chronic | Status: ACTIVE | Noted: 2020-11-16

## 2021-01-29 PROBLEM — Z87.81 PERSONAL HISTORY OF (HEALED) TRAUMATIC FRACTURE: Chronic | Status: ACTIVE | Noted: 2020-11-16

## 2021-02-10 ENCOUNTER — APPOINTMENT (OUTPATIENT)
Dept: NEUROLOGY | Facility: CLINIC | Age: 56
End: 2021-02-10

## 2021-02-15 ENCOUNTER — APPOINTMENT (OUTPATIENT)
Dept: MAMMOGRAPHY | Facility: CLINIC | Age: 56
End: 2021-02-15
Payer: COMMERCIAL

## 2021-02-15 ENCOUNTER — APPOINTMENT (OUTPATIENT)
Dept: ULTRASOUND IMAGING | Facility: CLINIC | Age: 56
End: 2021-02-15
Payer: COMMERCIAL

## 2021-02-15 ENCOUNTER — OUTPATIENT (OUTPATIENT)
Dept: OUTPATIENT SERVICES | Facility: HOSPITAL | Age: 56
LOS: 1 days | End: 2021-02-15
Payer: COMMERCIAL

## 2021-02-15 DIAGNOSIS — Z00.8 ENCOUNTER FOR OTHER GENERAL EXAMINATION: ICD-10-CM

## 2021-02-15 DIAGNOSIS — Z98.890 OTHER SPECIFIED POSTPROCEDURAL STATES: Chronic | ICD-10-CM

## 2021-02-15 DIAGNOSIS — Z87.39 PERSONAL HISTORY OF OTHER DISEASES OF THE MUSCULOSKELETAL SYSTEM AND CONNECTIVE TISSUE: Chronic | ICD-10-CM

## 2021-02-15 DIAGNOSIS — N93.9 ABNORMAL UTERINE AND VAGINAL BLEEDING, UNSPECIFIED: Chronic | ICD-10-CM

## 2021-02-15 PROCEDURE — 77067 SCR MAMMO BI INCL CAD: CPT | Mod: 26

## 2021-02-15 PROCEDURE — 77063 BREAST TOMOSYNTHESIS BI: CPT | Mod: 26

## 2021-02-15 PROCEDURE — 77067 SCR MAMMO BI INCL CAD: CPT

## 2021-02-15 PROCEDURE — 76641 ULTRASOUND BREAST COMPLETE: CPT | Mod: 26,50

## 2021-02-15 PROCEDURE — 77063 BREAST TOMOSYNTHESIS BI: CPT

## 2021-02-15 PROCEDURE — 76641 ULTRASOUND BREAST COMPLETE: CPT

## 2022-02-07 ENCOUNTER — NON-APPOINTMENT (OUTPATIENT)
Age: 57
End: 2022-02-07

## 2022-02-23 ENCOUNTER — NON-APPOINTMENT (OUTPATIENT)
Age: 57
End: 2022-02-23

## 2022-03-10 ENCOUNTER — APPOINTMENT (OUTPATIENT)
Dept: DERMATOLOGY | Facility: CLINIC | Age: 57
End: 2022-03-10
Payer: COMMERCIAL

## 2022-03-10 PROCEDURE — 17004 DESTROY PREMAL LESIONS 15/>: CPT

## 2022-03-10 PROCEDURE — 99204 OFFICE O/P NEW MOD 45 MIN: CPT | Mod: 25

## 2022-03-10 RX ORDER — TRIAMCINOLONE ACETONIDE 1 MG/G
0.1 OINTMENT TOPICAL
Qty: 1 | Refills: 0 | Status: ACTIVE | COMMUNITY
Start: 2022-03-10 | End: 1900-01-01

## 2022-03-18 ENCOUNTER — APPOINTMENT (OUTPATIENT)
Dept: INTERNAL MEDICINE | Facility: CLINIC | Age: 57
End: 2022-03-18
Payer: COMMERCIAL

## 2022-03-18 VITALS
WEIGHT: 133 LBS | HEIGHT: 64 IN | BODY MASS INDEX: 22.71 KG/M2 | SYSTOLIC BLOOD PRESSURE: 113 MMHG | TEMPERATURE: 98 F | DIASTOLIC BLOOD PRESSURE: 73 MMHG | OXYGEN SATURATION: 99 % | HEART RATE: 57 BPM

## 2022-03-18 DIAGNOSIS — G57.31 LESION OF LATERAL POPLITEAL NERVE, RIGHT LOWER LIMB: ICD-10-CM

## 2022-03-18 DIAGNOSIS — L90.5 SCAR CONDITIONS AND FIBROSIS OF SKIN: ICD-10-CM

## 2022-03-18 DIAGNOSIS — Z78.0 ASYMPTOMATIC MENOPAUSAL STATE: ICD-10-CM

## 2022-03-18 DIAGNOSIS — Z00.00 ENCOUNTER FOR GENERAL ADULT MEDICAL EXAMINATION W/OUT ABNORMAL FINDINGS: ICD-10-CM

## 2022-03-18 DIAGNOSIS — N20.1 CALCULUS OF URETER: ICD-10-CM

## 2022-03-18 DIAGNOSIS — Z12.83 ENCOUNTER FOR SCREENING FOR MALIGNANT NEOPLASM OF SKIN: ICD-10-CM

## 2022-03-18 DIAGNOSIS — G24.8 OTHER DYSTONIA: ICD-10-CM

## 2022-03-18 DIAGNOSIS — M85.80 OTHER SPECIFIED DISORDERS OF BONE DENSITY AND STRUCTURE, UNSPECIFIED SITE: ICD-10-CM

## 2022-03-18 DIAGNOSIS — M62.561 MUSCLE WASTING AND ATROPHY, NOT ELSEWHERE CLASSIFIED, RIGHT LOWER LEG: ICD-10-CM

## 2022-03-18 DIAGNOSIS — L81.2 FRECKLES: ICD-10-CM

## 2022-03-18 PROCEDURE — 99386 PREV VISIT NEW AGE 40-64: CPT | Mod: 25

## 2022-03-18 PROCEDURE — 36415 COLL VENOUS BLD VENIPUNCTURE: CPT

## 2022-03-18 RX ORDER — ONABOTULINUMTOXINA 100 [USP'U]/1
100 INJECTION, POWDER, LYOPHILIZED, FOR SOLUTION INTRADERMAL; INTRAMUSCULAR
Qty: 1 | Refills: 3 | Status: DISCONTINUED | COMMUNITY
Start: 2020-09-08 | End: 2022-03-18

## 2022-03-31 PROBLEM — Z78.0 MENOPAUSE: Status: ACTIVE | Noted: 2022-03-31

## 2022-03-31 PROBLEM — M62.561 ATROPHY OF CALF MUSCLES ON RIGHT: Status: ACTIVE | Noted: 2020-07-02

## 2022-03-31 PROBLEM — G57.31: Status: ACTIVE | Noted: 2022-03-18

## 2022-03-31 PROBLEM — M85.80 OSTEOPENIA: Status: ACTIVE | Noted: 2022-03-31

## 2022-03-31 NOTE — HEALTH RISK ASSESSMENT
[Never] : Never [Yes] : Yes [No] : In the past 12 months have you used drugs other than those required for medical reasons? No [0] : 2) Feeling down, depressed, or hopeless: Not at all (0) [PHQ-2 Negative - No further assessment needed] : PHQ-2 Negative - No further assessment needed [MYC3Lsynd] : 0 [Patient reported mammogram was normal] : Patient reported mammogram was normal [Patient reported PAP Smear was normal] : Patient reported PAP Smear was normal [Patient reported bone density results were abnormal] : Patient reported bone density results were abnormal [Patient reported colonoscopy was normal] : Patient reported colonoscopy was normal [Change in mental status noted] : No change in mental status noted [None] : None [With Family] : lives with family [Employed] : employed [] :  [Sexually Active] : sexually active [High Risk Behavior] : no high risk behavior [Feels Safe at Home] : Feels safe at home [Reports changes in hearing] : Reports no changes in hearing [Reports changes in vision] : Reports no changes in vision [Reports changes in dental health] : Reports no changes in dental health [MammogramDate] : 08/2021 [PapSmearDate] : 01/2021 [BoneDensityDate] : 2019 [BoneDensityComments] : Osteopenia  [ColonoscopyDate] : 2019

## 2022-03-31 NOTE — PHYSICAL EXAM
[No Acute Distress] : no acute distress [Well Nourished] : well nourished [Well Developed] : well developed [Well-Appearing] : well-appearing [Normal Sclera/Conjunctiva] : normal sclera/conjunctiva [PERRL] : pupils equal round and reactive to light [EOMI] : extraocular movements intact [Normal Outer Ear/Nose] : the outer ears and nose were normal in appearance [Normal Oropharynx] : the oropharynx was normal [Normal TMs] : both tympanic membranes were normal [Normal Nasal Mucosa] : the nasal mucosa was normal [No JVD] : no jugular venous distention [No Lymphadenopathy] : no lymphadenopathy [Supple] : supple [Thyroid Normal, No Nodules] : the thyroid was normal and there were no nodules present [No Respiratory Distress] : no respiratory distress  [No Accessory Muscle Use] : no accessory muscle use [Clear to Auscultation] : lungs were clear to auscultation bilaterally [Normal Rate] : normal rate  [Regular Rhythm] : with a regular rhythm [Normal S1, S2] : normal S1 and S2 [No Murmur] : no murmur heard [No Carotid Bruits] : no carotid bruits [No Abdominal Bruit] : a ~M bruit was not heard ~T in the abdomen [No Varicosities] : no varicosities [Pedal Pulses Present] : the pedal pulses are present [No Edema] : there was no peripheral edema [No Palpable Aorta] : no palpable aorta [No Extremity Clubbing/Cyanosis] : no extremity clubbing/cyanosis [Soft] : abdomen soft [Non Tender] : non-tender [Non-distended] : non-distended [No Masses] : no abdominal mass palpated [No HSM] : no HSM [Normal Bowel Sounds] : normal bowel sounds [Normal Posterior Cervical Nodes] : no posterior cervical lymphadenopathy [Normal Anterior Cervical Nodes] : no anterior cervical lymphadenopathy [No CVA Tenderness] : no CVA  tenderness [No Spinal Tenderness] : no spinal tenderness [No Joint Swelling] : no joint swelling [Grossly Normal Strength/Tone] : grossly normal strength/tone [No Rash] : no rash [Coordination Grossly Intact] : coordination grossly intact [No Focal Deficits] : no focal deficits [Normal Gait] : normal gait [Deep Tendon Reflexes (DTR)] : deep tendon reflexes were 2+ and symmetric [Speech Grossly Normal] : speech grossly normal [Memory Grossly Normal] : memory grossly normal [Normal Affect] : the affect was normal [Alert and Oriented x3] : oriented to person, place, and time [Normal Mood] : the mood was normal [Normal Insight/Judgement] : insight and judgment were intact [de-identified] : +atrophy of calf muscles on right LE

## 2022-03-31 NOTE — HISTORY OF PRESENT ILLNESS
[de-identified] : 57 y/o female patient is new to the office presents today for annual CPE/fasting labs\par - HCM: mammogram - 08/2021 WNL, PAP - 01/2021 WNL, colonoscopy - 2019 WNL, repeat in 5 years, DEXA - 2019 osteopenia

## 2022-04-14 ENCOUNTER — TRANSCRIPTION ENCOUNTER (OUTPATIENT)
Age: 57
End: 2022-04-14

## 2022-04-14 LAB
25(OH)D3 SERPL-MCNC: 30.8 NG/ML
ALBUMIN SERPL ELPH-MCNC: 4.3 G/DL
ALP BLD-CCNC: 57 U/L
ALT SERPL-CCNC: 16 U/L
ANION GAP SERPL CALC-SCNC: 8 MMOL/L
APPEARANCE: CLEAR
AST SERPL-CCNC: 14 U/L
BACTERIA: NEGATIVE
BASOPHILS # BLD AUTO: 0.06 K/UL
BASOPHILS NFR BLD AUTO: 1.2 %
BILIRUB SERPL-MCNC: 0.2 MG/DL
BILIRUBIN URINE: NEGATIVE
BLOOD URINE: NEGATIVE
BUN SERPL-MCNC: 14 MG/DL
CALCIUM SERPL-MCNC: 9.6 MG/DL
CHLORIDE SERPL-SCNC: 106 MMOL/L
CHOLEST SERPL-MCNC: 182 MG/DL
CO2 SERPL-SCNC: 29 MMOL/L
COLOR: YELLOW
CREAT SERPL-MCNC: 0.66 MG/DL
EGFR: 103 ML/MIN/1.73M2
EOSINOPHIL # BLD AUTO: 0.19 K/UL
EOSINOPHIL NFR BLD AUTO: 3.8 %
ESTIMATED AVERAGE GLUCOSE: 114 MG/DL
FOLATE SERPL-MCNC: 5.4 NG/ML
GLUCOSE QUALITATIVE U: NEGATIVE
GLUCOSE SERPL-MCNC: 95 MG/DL
HBA1C MFR BLD HPLC: 5.6 %
HCT VFR BLD CALC: 42.7 %
HDLC SERPL-MCNC: 61 MG/DL
HGB BLD-MCNC: 13.1 G/DL
HYALINE CASTS: 2 /LPF
IMM GRANULOCYTES NFR BLD AUTO: 0.2 %
IRON SATN MFR SERPL: 36 %
IRON SERPL-MCNC: 95 UG/DL
KETONES URINE: NEGATIVE
LDLC SERPL CALC-MCNC: 110 MG/DL
LEUKOCYTE ESTERASE URINE: NEGATIVE
LYMPHOCYTES # BLD AUTO: 2.26 K/UL
LYMPHOCYTES NFR BLD AUTO: 45.6 %
MAGNESIUM SERPL-MCNC: 2.1 MG/DL
MAN DIFF?: NORMAL
MCHC RBC-ENTMCNC: 28.9 PG
MCHC RBC-ENTMCNC: 30.7 GM/DL
MCV RBC AUTO: 94.1 FL
MICROSCOPIC-UA: NORMAL
MONOCYTES # BLD AUTO: 0.46 K/UL
MONOCYTES NFR BLD AUTO: 9.3 %
NEUTROPHILS # BLD AUTO: 1.98 K/UL
NEUTROPHILS NFR BLD AUTO: 39.9 %
NITRITE URINE: NEGATIVE
NONHDLC SERPL-MCNC: 120 MG/DL
PH URINE: 7.5
PLATELET # BLD AUTO: 245 K/UL
POTASSIUM SERPL-SCNC: 4.4 MMOL/L
PROT SERPL-MCNC: 6.3 G/DL
PROTEIN URINE: NORMAL
RBC # BLD: 4.54 M/UL
RBC # FLD: 13 %
RED BLOOD CELLS URINE: 3 /HPF
SODIUM SERPL-SCNC: 144 MMOL/L
SPECIFIC GRAVITY URINE: 1.02
SQUAMOUS EPITHELIAL CELLS: 2 /HPF
TIBC SERPL-MCNC: 262 UG/DL
TRIGL SERPL-MCNC: 49 MG/DL
TSH SERPL-ACNC: 0.8 UIU/ML
UIBC SERPL-MCNC: 168 UG/DL
UROBILINOGEN URINE: NORMAL
VIT B12 SERPL-MCNC: 341 PG/ML
WBC # FLD AUTO: 4.96 K/UL
WHITE BLOOD CELLS URINE: 1 /HPF

## 2022-04-14 RX ORDER — ERGOCALCIFEROL 1.25 MG/1
1.25 MG CAPSULE, LIQUID FILLED ORAL
Qty: 12 | Refills: 1 | Status: ACTIVE | COMMUNITY
Start: 2022-04-14 | End: 1900-01-01

## 2022-05-06 ENCOUNTER — APPOINTMENT (OUTPATIENT)
Dept: INTERNAL MEDICINE | Facility: CLINIC | Age: 57
End: 2022-05-06
Payer: COMMERCIAL

## 2022-05-06 VITALS
TEMPERATURE: 98 F | HEART RATE: 74 BPM | OXYGEN SATURATION: 98 % | SYSTOLIC BLOOD PRESSURE: 133 MMHG | HEIGHT: 64 IN | DIASTOLIC BLOOD PRESSURE: 77 MMHG | WEIGHT: 133.5 LBS | BODY MASS INDEX: 22.79 KG/M2

## 2022-05-06 DIAGNOSIS — R53.83 OTHER FATIGUE: ICD-10-CM

## 2022-05-06 DIAGNOSIS — B94.8 DYSPNEA, UNSPECIFIED: ICD-10-CM

## 2022-05-06 DIAGNOSIS — R06.00 DYSPNEA, UNSPECIFIED: ICD-10-CM

## 2022-05-06 DIAGNOSIS — R22.1 LOCALIZED SWELLING, MASS AND LUMP, NECK: ICD-10-CM

## 2022-05-06 DIAGNOSIS — Z86.16 PERSONAL HISTORY OF COVID-19: ICD-10-CM

## 2022-05-06 PROCEDURE — 36415 COLL VENOUS BLD VENIPUNCTURE: CPT

## 2022-05-06 PROCEDURE — 99214 OFFICE O/P EST MOD 30 MIN: CPT | Mod: 25

## 2022-05-06 RX ORDER — BENZONATATE 200 MG/1
200 CAPSULE ORAL
Qty: 45 | Refills: 0 | Status: ACTIVE | COMMUNITY
Start: 2022-05-06 | End: 1900-01-01

## 2022-05-06 RX ORDER — BUDESONIDE AND FORMOTEROL FUMARATE DIHYDRATE 80; 4.5 UG/1; UG/1
80-4.5 AEROSOL RESPIRATORY (INHALATION)
Qty: 1 | Refills: 1 | Status: ACTIVE | COMMUNITY
Start: 2022-05-06 | End: 1900-01-01

## 2022-05-09 ENCOUNTER — APPOINTMENT (OUTPATIENT)
Dept: ULTRASOUND IMAGING | Facility: CLINIC | Age: 57
End: 2022-05-09
Payer: COMMERCIAL

## 2022-05-09 PROCEDURE — 76536 US EXAM OF HEAD AND NECK: CPT

## 2022-05-26 ENCOUNTER — TRANSCRIPTION ENCOUNTER (OUTPATIENT)
Age: 57
End: 2022-05-26

## 2022-05-26 DIAGNOSIS — E04.1 NONTOXIC SINGLE THYROID NODULE: ICD-10-CM

## 2022-05-26 DIAGNOSIS — E04.2 NONTOXIC MULTINODULAR GOITER: ICD-10-CM

## 2022-05-26 PROBLEM — R06.00 PERSISTENT SHORTNESS OF BREATH AFTER COVID-19: Status: ACTIVE | Noted: 2022-05-06

## 2022-05-26 PROBLEM — R22.1 NECK MASS: Status: ACTIVE | Noted: 2022-05-06

## 2022-05-26 LAB
ALBUMIN SERPL ELPH-MCNC: 4.4 G/DL
ALP BLD-CCNC: 65 U/L
ALT SERPL-CCNC: 34 U/L
ANION GAP SERPL CALC-SCNC: 12 MMOL/L
AST SERPL-CCNC: 22 U/L
BASOPHILS # BLD AUTO: 0.03 K/UL
BASOPHILS NFR BLD AUTO: 0.5 %
BILIRUB SERPL-MCNC: 0.2 MG/DL
BUN SERPL-MCNC: 14 MG/DL
CALCIUM SERPL-MCNC: 9.4 MG/DL
CHLORIDE SERPL-SCNC: 103 MMOL/L
CO2 SERPL-SCNC: 28 MMOL/L
CREAT SERPL-MCNC: 0.69 MG/DL
EGFR: 102 ML/MIN/1.73M2
EOSINOPHIL # BLD AUTO: 0.26 K/UL
EOSINOPHIL NFR BLD AUTO: 4 %
GLUCOSE SERPL-MCNC: 98 MG/DL
HCT VFR BLD CALC: 44.7 %
HGB BLD-MCNC: 13.9 G/DL
IMM GRANULOCYTES NFR BLD AUTO: 0.6 %
LYMPHOCYTES # BLD AUTO: 2.43 K/UL
LYMPHOCYTES NFR BLD AUTO: 37 %
MAN DIFF?: NORMAL
MCHC RBC-ENTMCNC: 29.3 PG
MCHC RBC-ENTMCNC: 31.1 GM/DL
MCV RBC AUTO: 94.1 FL
MONOCYTES # BLD AUTO: 0.55 K/UL
MONOCYTES NFR BLD AUTO: 8.4 %
NEUTROPHILS # BLD AUTO: 3.26 K/UL
NEUTROPHILS NFR BLD AUTO: 49.5 %
PLATELET # BLD AUTO: 282 K/UL
POTASSIUM SERPL-SCNC: 4.3 MMOL/L
PROT SERPL-MCNC: 6.5 G/DL
RBC # BLD: 4.75 M/UL
RBC # FLD: 12.9 %
SODIUM SERPL-SCNC: 142 MMOL/L
T3FREE SERPL-MCNC: 3.41 PG/ML
T4 FREE SERPL-MCNC: 1.5 NG/DL
THYROGLOB AB SERPL-ACNC: <20 IU/ML
THYROPEROXIDASE AB SERPL IA-ACNC: <10 IU/ML
TSH SERPL-ACNC: 0.48 UIU/ML
WBC # FLD AUTO: 6.57 K/UL

## 2022-05-26 NOTE — HISTORY OF PRESENT ILLNESS
[de-identified] : 57 y/o female patient presents today:\par - f/u Covid-19 infection, having persistent SOB, all other Covid symptoms resolved, seen in UC due to neck mass, diagnosed with possible thyroiditis vs. lymphadenopathy, given corticosteroids and advised to follow up with PCP

## 2022-05-26 NOTE — REVIEW OF SYSTEMS
[Fever] : no fever [Chills] : no chills [Fatigue] : fatigue [Shortness Of Breath] : shortness of breath [Wheezing] : no wheezing [Cough] : cough [Dyspnea on Exertion] : dyspnea on exertion [Negative] : Integumentary

## 2022-06-14 ENCOUNTER — APPOINTMENT (OUTPATIENT)
Dept: NEUROLOGY | Facility: CLINIC | Age: 57
End: 2022-06-14
Payer: COMMERCIAL

## 2022-06-14 PROCEDURE — 99214 OFFICE O/P EST MOD 30 MIN: CPT

## 2022-06-14 NOTE — HISTORY OF PRESENT ILLNESS
[FreeTextEntry1] : The patient is a 57-year-old right-handed woman, who works as an nurse at [employer], and who developed abnormal movements of the right foot about 9 months ago. She was doing a 21 day fitness challenge in one day started tripping over her right foot on the treadmill. This was fairly sudden onset, with no preceding illness, trauma, or pain. She notices that her foot plantar flexes and pulls to the inside when she is walking, especially when she is tired. This is worsened when she walks up stairs. When she walks downstairs she mostly noticed the plantarflexion. Walking backwards improves her symptoms.\par \par She was evaluated by several orthopedics, and eventually neurologists. One EMG suggested a possible L4-5 radiculopathy, though an MRI of the back was unremarkable. A second neurologist did not notice any foot drop, and they performed an ultrasound, showing "inflammation of the peroneal nerve". Another EMG was performed which did not show any nerve damage. That neurologist, Dr. Kane Bradshaw diagnosed her with dystonia, and gave her a trial of carbidopa/levodopa, 25/100, 3 times a day. This caused significant nausea but did not provide any benefit. An MRI of the brain with and without contrast was unremarkable.\par \par She has no other abnormal movements anywhere, except for a long-standing history of a mild head tremor, for which she received botulinum toxin in the past. She has no weakness, numbness, speech changes, visual changes, or issues with balance. There is no family history of neurologic disease. 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 Had botox in 2020, after it wore off, pain and dystonia was worse, she is not sure she wants it again\par 2. Wants PT again\par 3. If she [place]anton far away, may have to walk backwards.\par 4. Had peroneal nerve release which helped pain

## 2022-06-14 NOTE — PHYSICAL EXAM
[Person] : oriented to person [Place] : oriented to place [Time] : oriented to time [Naming Objects] : no difficulty naming common objects [Repeating Phrases] : no difficulty repeating a phrase [Fluency] : fluency intact [Comprehension] : comprehension intact [Cranial Nerves Optic (II)] : visual acuity intact bilaterally,  visual fields full to confrontation, pupils equal round and reactive to light [Cranial Nerves Oculomotor (III)] : extraocular motion intact [Cranial Nerves Trigeminal (V)] : facial sensation intact symmetrically [Cranial Nerves Facial (VII)] : face symmetrical [Cranial Nerves Vestibulocochlear (VIII)] : hearing was intact bilaterally [Cranial Nerves Glossopharyngeal (IX)] : tongue and palate midline [Cranial Nerves Accessory (XI - Cranial And Spinal)] : head turning and shoulder shrug symmetric [Motor Strength] : muscle strength was normal in all four extremities [Cranial Nerves Hypoglossal (XII)] : there was no tongue deviation with protrusion [No Muscle Atrophy] : normal bulk in all four extremities [Motor Handedness Right-Handed] : the patient is right hand dominant [2+] : Ankle jerk left 2+ [Paresis Pronator Drift Right-Sided] : no pronator drift on the right [Paresis Pronator Drift Left-Sided] : no pronator drift on the left [Plantar Reflex Right Only] : normal on the right [Plantar Reflex Left Only] : normal on the left [FreeTextEntry1] : Facial expression and volume of speech were normal. Extraocular movements were intact with normal saccade, smooth pursuit, no square wave jerks seen. Tone was normal throughout. Rapid alternating hand movements and foot tapping were normal bilaterally. She did get up from the chair without using her arms. Gait was normal based and steady. \par \par She had a mild, intermittent side-to-side head tremor with no abnormal head posturing.\par \par During walking, her right foot showed frequent plantar flexion and inversion, which could worsen longer she walked. Occasionally she seemed to have eversion and dorsiflexion, though this appeared compensatory. \par  Walking backwards improved but did not resolve the foot movement. \par \par There were no other abnormal movements that I could observe.\par

## 2022-06-14 NOTE — DISCUSSION/SUMMARY
[FreeTextEntry1] : In summary, the patient is a 55-year-old right-handed woman with a nine-month history of sudden onset abnormal movements of the right foot. The examination was significant for right foot inversion and plantarflexion doing walking and stair climbing, with occasional compensatory eversion movements. 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 The sudden onset is a little unusual, in the absence of injury, but she was doing intensive workouts at that time which can occasionally bring out dystonia. It has not significantly progressed since onset. There were no other findings on examination would point to a more widespread neurologic disorder. She did already undergo a trial of levodopa, which is appropriate given the symptom distribution, but this did not help, making DRD unlikely. She has no family history that would point to a genetic generalized dystonia.\par \par 1. She is hesitant to have injections again\par 2. Already tried LD, can try baclofen and PT prior to something like artane. \par \par We discussed the above impression, plan and recommendations during the visit. Counseling represented more then 50% of the 30 minute visit time

## 2022-06-17 ENCOUNTER — TRANSCRIPTION ENCOUNTER (OUTPATIENT)
Age: 57
End: 2022-06-17

## 2022-08-12 NOTE — PACU DISCHARGE NOTE - PAIN:
IPSTART~^~1~^~[id number]~^~~^~IPEND  PKSTART~^~[id number]~^~PKEND  
Controlled with current regime
IPSTART~^~1~^~[id number]~^~~^~IPEND  PKSTART~^~[id number]~^~PKEND  IPSTART~^~2~^~[id number]~^~~^~IPEND  PKSTART~^~[id number]~^~PKEND  IPSTART~^~3~^~[id number]~^~~^~IPEND  PKSTART~^~[id number]~^~PKEND  IPSTART~^~4~^~[id number]~^~~^~IPEND  PKSTART~^~[id number]~^~PKEND  IPSTART~^~5~^~[id number]~^~~^~IPEND  PKSTART~^~[id number]~^~PKEND  IPSTART~^~6~^~[id number]~^~~^~IPEND  PKSTART~^~[id number]~^~PKEND  
IPSTART~^~1~^~[id number]~^~~^~IPEND  PKSTART~^~[id number]~^~PKEND  IPSTART~^~2~^~[id number]~^~~^~IPEND  PKSTART~^~[id number]~^~PKEND  IPSTART~^~3~^~[id number]~^~~^~IPEND  PKSTART~^~[id number]~^~PKEND  IPSTART~^~4~^~67220268280273~^~~^~IPEND  PKSTART~^~47431032481273~^~PKEND  IPSTART~^~5~^~[id number]~^~~^~IPEND  PKSTART~^~[id number]~^~PKEND

## 2022-08-24 ENCOUNTER — RESULT REVIEW (OUTPATIENT)
Age: 57
End: 2022-08-24

## 2022-09-08 ENCOUNTER — OUTPATIENT (OUTPATIENT)
Dept: OUTPATIENT SERVICES | Facility: HOSPITAL | Age: 57
LOS: 1 days | End: 2022-09-08
Payer: COMMERCIAL

## 2022-09-08 VITALS
WEIGHT: 136.03 LBS | RESPIRATION RATE: 18 BRPM | SYSTOLIC BLOOD PRESSURE: 111 MMHG | TEMPERATURE: 98 F | HEIGHT: 64 IN | DIASTOLIC BLOOD PRESSURE: 75 MMHG | HEART RATE: 72 BPM | OXYGEN SATURATION: 97 %

## 2022-09-08 DIAGNOSIS — Z87.39 PERSONAL HISTORY OF OTHER DISEASES OF THE MUSCULOSKELETAL SYSTEM AND CONNECTIVE TISSUE: Chronic | ICD-10-CM

## 2022-09-08 DIAGNOSIS — Z01.818 ENCOUNTER FOR OTHER PREPROCEDURAL EXAMINATION: ICD-10-CM

## 2022-09-08 DIAGNOSIS — N93.9 ABNORMAL UTERINE AND VAGINAL BLEEDING, UNSPECIFIED: Chronic | ICD-10-CM

## 2022-09-08 DIAGNOSIS — N84.0 POLYP OF CORPUS UTERI: ICD-10-CM

## 2022-09-08 DIAGNOSIS — Z98.890 OTHER SPECIFIED POSTPROCEDURAL STATES: Chronic | ICD-10-CM

## 2022-09-08 PROCEDURE — 86901 BLOOD TYPING SEROLOGIC RH(D): CPT

## 2022-09-08 PROCEDURE — G0463: CPT

## 2022-09-08 PROCEDURE — 86900 BLOOD TYPING SEROLOGIC ABO: CPT

## 2022-09-08 PROCEDURE — 86850 RBC ANTIBODY SCREEN: CPT

## 2022-09-08 PROCEDURE — 85027 COMPLETE CBC AUTOMATED: CPT

## 2022-09-08 RX ORDER — SODIUM CHLORIDE 9 MG/ML
1000 INJECTION, SOLUTION INTRAVENOUS
Refills: 0 | Status: DISCONTINUED | OUTPATIENT
Start: 2022-09-14 | End: 2022-09-28

## 2022-09-08 RX ORDER — LIDOCAINE HCL 20 MG/ML
0.2 VIAL (ML) INJECTION ONCE
Refills: 0 | Status: DISCONTINUED | OUTPATIENT
Start: 2022-09-14 | End: 2022-09-28

## 2022-09-08 NOTE — H&P PST ADULT - NSICDXPASTMEDICALHX_GEN_ALL_CORE_FT
PAST MEDICAL HISTORY:  COVID-19 4/2022 congestion tx with steriods no follow-up needed    H/O clavicle fracture left, managed non surgical/ sling    Non-traumatic compartment syndrome of lower limb right  leg pain    Pain, joint, lower leg, right since 12/12/2019 peroneal nerve decompression 10/17/2020

## 2022-09-08 NOTE — H&P PST ADULT - NSICDXPASTSURGICALHX_GEN_ALL_CORE_FT
PAST SURGICAL HISTORY:  H/O ganglion cyst     History of lumpectomy right breast, benign    Uterine bleeding h/o novasure treatment done

## 2022-09-08 NOTE — H&P PST ADULT - HEIGHT IN FEET
LVM for check out to schedule return appt w Dr. Gomez, follow up in 3 months in clinic, provided clinic number for call back.    
5

## 2022-09-08 NOTE — H&P PST ADULT - HISTORY OF PRESENT ILLNESS
57yr old female with uterine polyp that was discovered on routine examination. Pt denies bleeding or cramping. Now coming in for D&C Dx hysteroscopy resection of endometrial polyp with myosure. No sig med hx    Note; covid test 9/13/2022. [facility]

## 2022-09-13 ENCOUNTER — TRANSCRIPTION ENCOUNTER (OUTPATIENT)
Age: 57
End: 2022-09-13

## 2022-09-13 ENCOUNTER — OUTPATIENT (OUTPATIENT)
Dept: OUTPATIENT SERVICES | Facility: HOSPITAL | Age: 57
LOS: 1 days | End: 2022-09-13
Payer: COMMERCIAL

## 2022-09-13 DIAGNOSIS — Z98.890 OTHER SPECIFIED POSTPROCEDURAL STATES: Chronic | ICD-10-CM

## 2022-09-13 DIAGNOSIS — Z11.52 ENCOUNTER FOR SCREENING FOR COVID-19: ICD-10-CM

## 2022-09-13 DIAGNOSIS — N93.9 ABNORMAL UTERINE AND VAGINAL BLEEDING, UNSPECIFIED: Chronic | ICD-10-CM

## 2022-09-13 DIAGNOSIS — Z87.39 PERSONAL HISTORY OF OTHER DISEASES OF THE MUSCULOSKELETAL SYSTEM AND CONNECTIVE TISSUE: Chronic | ICD-10-CM

## 2022-09-13 LAB — SARS-COV-2 RNA SPEC QL NAA+PROBE: SIGNIFICANT CHANGE UP

## 2022-09-13 PROCEDURE — C9803: CPT

## 2022-09-13 PROCEDURE — U0005: CPT

## 2022-09-13 PROCEDURE — U0003: CPT

## 2022-09-14 ENCOUNTER — RESULT REVIEW (OUTPATIENT)
Age: 57
End: 2022-09-14

## 2022-09-14 ENCOUNTER — OUTPATIENT (OUTPATIENT)
Dept: OUTPATIENT SERVICES | Facility: HOSPITAL | Age: 57
LOS: 1 days | End: 2022-09-14
Payer: COMMERCIAL

## 2022-09-14 ENCOUNTER — TRANSCRIPTION ENCOUNTER (OUTPATIENT)
Age: 57
End: 2022-09-14

## 2022-09-14 VITALS
HEIGHT: 64 IN | WEIGHT: 136.03 LBS | HEART RATE: 79 BPM | OXYGEN SATURATION: 98 % | SYSTOLIC BLOOD PRESSURE: 101 MMHG | RESPIRATION RATE: 16 BRPM | DIASTOLIC BLOOD PRESSURE: 63 MMHG | TEMPERATURE: 99 F

## 2022-09-14 VITALS
DIASTOLIC BLOOD PRESSURE: 65 MMHG | HEART RATE: 80 BPM | RESPIRATION RATE: 16 BRPM | SYSTOLIC BLOOD PRESSURE: 123 MMHG | OXYGEN SATURATION: 99 %

## 2022-09-14 DIAGNOSIS — Z87.39 PERSONAL HISTORY OF OTHER DISEASES OF THE MUSCULOSKELETAL SYSTEM AND CONNECTIVE TISSUE: Chronic | ICD-10-CM

## 2022-09-14 DIAGNOSIS — N93.9 ABNORMAL UTERINE AND VAGINAL BLEEDING, UNSPECIFIED: Chronic | ICD-10-CM

## 2022-09-14 DIAGNOSIS — N84.0 POLYP OF CORPUS UTERI: ICD-10-CM

## 2022-09-14 DIAGNOSIS — Z98.890 OTHER SPECIFIED POSTPROCEDURAL STATES: Chronic | ICD-10-CM

## 2022-09-14 PROCEDURE — 58558 HYSTEROSCOPY BIOPSY: CPT

## 2022-09-14 PROCEDURE — 88305 TISSUE EXAM BY PATHOLOGIST: CPT

## 2022-09-14 PROCEDURE — 88305 TISSUE EXAM BY PATHOLOGIST: CPT | Mod: 26

## 2022-09-14 DEVICE — MYOSURE TISSUE REMOVAL DEVICE REACH: Type: IMPLANTABLE DEVICE | Status: FUNCTIONAL

## 2022-09-14 DEVICE — MYOSURE TISSUE REMOVAL FMS FOR FLUENT XL: Type: IMPLANTABLE DEVICE | Status: FUNCTIONAL

## 2022-09-14 DEVICE — MYOSURE TISSUE REMOVAL DEVICE LITE: Type: IMPLANTABLE DEVICE | Status: FUNCTIONAL

## 2022-09-14 RX ORDER — ONDANSETRON 8 MG/1
4 TABLET, FILM COATED ORAL ONCE
Refills: 0 | Status: DISCONTINUED | OUTPATIENT
Start: 2022-09-14 | End: 2022-09-28

## 2022-09-14 RX ORDER — OXYCODONE HYDROCHLORIDE 5 MG/1
5 TABLET ORAL ONCE
Refills: 0 | Status: DISCONTINUED | OUTPATIENT
Start: 2022-09-14 | End: 2022-09-14

## 2022-09-14 RX ORDER — FENTANYL CITRATE 50 UG/ML
25 INJECTION INTRAVENOUS
Refills: 0 | Status: DISCONTINUED | OUTPATIENT
Start: 2022-09-14 | End: 2022-09-14

## 2022-09-14 NOTE — ASU PATIENT PROFILE, ADULT - FALL HARM RISK - UNIVERSAL INTERVENTIONS
Bed in lowest position, wheels locked, appropriate side rails in place/Call bell, personal items and telephone in reach/Instruct patient to call for assistance before getting out of bed or chair/Non-slip footwear when patient is out of bed/[place] to call system/Physically safe environment - no spills, clutter or unnecessary equipment/Purposeful Proactive Rounding/Room/bathroom lighting operational, light cord in reach

## 2022-09-20 LAB — SURGICAL PATHOLOGY STUDY: SIGNIFICANT CHANGE UP

## 2022-09-23 ENCOUNTER — OUTPATIENT (OUTPATIENT)
Dept: OUTPATIENT SERVICES | Facility: HOSPITAL | Age: 57
LOS: 1 days | End: 2022-09-23
Payer: COMMERCIAL

## 2022-09-23 ENCOUNTER — APPOINTMENT (OUTPATIENT)
Dept: MAMMOGRAPHY | Facility: CLINIC | Age: 57
End: 2022-09-23

## 2022-09-23 ENCOUNTER — APPOINTMENT (OUTPATIENT)
Dept: ULTRASOUND IMAGING | Facility: CLINIC | Age: 57
End: 2022-09-23

## 2022-09-23 DIAGNOSIS — Z98.890 OTHER SPECIFIED POSTPROCEDURAL STATES: Chronic | ICD-10-CM

## 2022-09-23 DIAGNOSIS — N93.9 ABNORMAL UTERINE AND VAGINAL BLEEDING, UNSPECIFIED: Chronic | ICD-10-CM

## 2022-09-23 DIAGNOSIS — Z87.39 PERSONAL HISTORY OF OTHER DISEASES OF THE MUSCULOSKELETAL SYSTEM AND CONNECTIVE TISSUE: Chronic | ICD-10-CM

## 2022-09-23 DIAGNOSIS — Z00.8 ENCOUNTER FOR OTHER GENERAL EXAMINATION: ICD-10-CM

## 2022-09-23 PROBLEM — U07.1 COVID-19: Chronic | Status: ACTIVE | Noted: 2022-09-08

## 2022-09-23 PROBLEM — M25.561 PAIN IN RIGHT KNEE: Chronic | Status: ACTIVE | Noted: 2020-11-16

## 2022-09-23 PROCEDURE — 76641 ULTRASOUND BREAST COMPLETE: CPT

## 2022-09-23 PROCEDURE — 76641 ULTRASOUND BREAST COMPLETE: CPT | Mod: 26,50

## 2022-09-23 PROCEDURE — 77063 BREAST TOMOSYNTHESIS BI: CPT | Mod: 26

## 2022-09-23 PROCEDURE — 77067 SCR MAMMO BI INCL CAD: CPT | Mod: 26

## 2022-09-23 PROCEDURE — 77067 SCR MAMMO BI INCL CAD: CPT

## 2022-09-23 PROCEDURE — 77063 BREAST TOMOSYNTHESIS BI: CPT

## 2022-10-11 ENCOUNTER — APPOINTMENT (OUTPATIENT)
Dept: NEUROLOGY | Facility: CLINIC | Age: 57
End: 2022-10-11

## 2022-10-11 PROCEDURE — 99214 OFFICE O/P EST MOD 30 MIN: CPT

## 2022-10-11 RX ORDER — ALPRAZOLAM 0.25 MG/1
0.25 TABLET ORAL
Qty: 90 | Refills: 0 | Status: ACTIVE | COMMUNITY
Start: 2022-10-11 | End: 1900-01-01

## 2022-10-11 NOTE — DISCUSSION/SUMMARY
[FreeTextEntry1] : In summary, the patient is a 57-year-old right-handed woman with a nine-month history of sudden onset abnormal movements of the right foot. The examination was significant for right foot inversion and plantarflexion doing walking and stair climbing, with occasional compensatory eversion movements. 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 The sudden onset is a little unusual, in the absence of injury, but she was doing intensive workouts at that time which can occasionally bring out dystonia. It has not significantly progressed since onset. There were no other findings on examination would point to a more widespread neurologic disorder. She did already undergo a trial of levodopa, which is appropriate given the symptom distribution, but this did not help, making DRD unlikely. She has no family history that would point to a genetic generalized dystonia.\par \par 1. She is hesitant to have injections again, doing better with PT/exercise \par 2. Already tried LD, continue baclofen at current dose. \par \par We discussed the above impression, plan and recommendations during the visit. Counseling represented more then 50% of the 30 minute visit time.

## 2022-10-11 NOTE — HISTORY OF PRESENT ILLNESS
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 She was doing a 21 day fitness challenge in one day started tripping over her right foot on the treadmill. This was fairly sudden onset, with no preceding illness, trauma, or pain. She notices that her foot plantar flexes and pulls to the inside when she is walking, especially when she is tired. This is worsened when she walks up stairs. When she walks downstairs she mostly noticed the plantarflexion. Walking backwards improves her symptoms.\par \par She was evaluated by several orthopedics, and eventually neurologists. One EMG suggested a possible L4-5 radiculopathy, though an MRI of the back was unremarkable. A second neurologist did not notice any foot drop, and they performed an ultrasound, showing "inflammation of the peroneal nerve". Another EMG was performed which did not show any nerve damage. That neurologist, Dr. Kane Bradshaw diagnosed her with dystonia, and gave her a trial of carbidopa/levodopa, 25/100, 3 times a day. This caused significant nausea but did not provide any benefit. An MRI of the brain with and without contrast was unremarkable.\par \par She has no other abnormal movements anywhere, except for a long-standing history of a mild head tremor, for which she received botulinum toxin in the past. She has no weakness, numbness, speech changes, visual changes, or issues with balance. There is no family history of neurologic disease. 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 Had botox in 2020, after it wore off, pain and dystonia was worse, she is not sure she wants it again\par 2. Went to [facility] for PT, did well with that. Now does it at home. Still taking baclofen\par 3. If she [place] far away, may have to walk backwards (especially up the hill).\par 4. 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

## 2022-10-11 NOTE — PHYSICAL EXAM
[Person] : oriented to person [Place] : oriented to place [Time] : oriented to time [Naming Objects] : no difficulty naming common objects [Repeating Phrases] : no difficulty repeating a phrase [Fluency] : fluency intact [Comprehension] : comprehension intact [Cranial Nerves Optic (II)] : visual acuity intact bilaterally,  visual fields full to confrontation, pupils equal round and reactive to light [Cranial Nerves Oculomotor (III)] : extraocular motion intact [Cranial Nerves Trigeminal (V)] : facial sensation intact symmetrically [Cranial Nerves Facial (VII)] : face symmetrical [Cranial Nerves Vestibulocochlear (VIII)] : hearing was intact bilaterally [Cranial Nerves Glossopharyngeal (IX)] : tongue and palate midline [Cranial Nerves Accessory (XI - Cranial And Spinal)] : head turning and shoulder shrug symmetric [Motor Strength] : muscle strength was normal in all four extremities [No Muscle Atrophy] : normal bulk in all four extremities [Motor Handedness Right-Handed] : the patient is right hand dominant [2+] : Brachioradialis left 2+ [Paresis Pronator Drift Right-Sided] : no pronator drift on the right [Paresis Pronator Drift Left-Sided] : no pronator drift on the left [Plantar Reflex Right Only] : normal on the right [Plantar Reflex Left Only] : normal on the left [FreeTextEntry1] : Facial expression and volume of speech were normal. Extraocular movements were intact with normal saccade, smooth pursuit, no square wave jerks seen. Tone was normal throughout. Rapid alternating hand movements and foot tapping were normal bilaterally. She did get up from the chair without using her arms. Gait was normal based and steady. \par \par She had a mild, intermittent side-to-side head tremor with no abnormal head posturing.\par \par During walking, her right foot showed much improved plantar flexion and inversion, which could worsen longer she walked.\par \par There were no other abnormal movements that I could observe.\par

## 2022-10-14 ENCOUNTER — TRANSCRIPTION ENCOUNTER (OUTPATIENT)
Age: 57
End: 2022-10-14

## 2022-11-09 ENCOUNTER — APPOINTMENT (OUTPATIENT)
Dept: DERMATOLOGY | Facility: CLINIC | Age: 57
End: 2022-11-09

## 2022-11-09 DIAGNOSIS — D22.9 MELANOCYTIC NEVI, UNSPECIFIED: ICD-10-CM

## 2022-11-09 PROCEDURE — 99214 OFFICE O/P EST MOD 30 MIN: CPT

## 2022-11-09 RX ORDER — FLUOROURACIL 50 MG/G
5 CREAM TOPICAL
Qty: 1 | Refills: 2 | Status: ACTIVE | COMMUNITY
Start: 2022-11-09 | End: 1900-01-01

## 2023-02-15 ENCOUNTER — APPOINTMENT (OUTPATIENT)
Dept: ULTRASOUND IMAGING | Facility: CLINIC | Age: 58
End: 2023-02-15
Payer: COMMERCIAL

## 2023-02-15 PROCEDURE — 76536 US EXAM OF HEAD AND NECK: CPT

## 2023-03-14 ENCOUNTER — APPOINTMENT (OUTPATIENT)
Dept: DERMATOLOGY | Facility: CLINIC | Age: 58
End: 2023-03-14
Payer: COMMERCIAL

## 2023-03-14 PROCEDURE — 99213 OFFICE O/P EST LOW 20 MIN: CPT

## 2023-07-14 ENCOUNTER — NON-APPOINTMENT (OUTPATIENT)
Age: 58
End: 2023-07-14

## 2023-08-07 ENCOUNTER — RESULT REVIEW (OUTPATIENT)
Age: 58
End: 2023-08-07

## 2023-08-09 RX ORDER — BACLOFEN 10 MG/1
10 TABLET ORAL
Qty: 270 | Refills: 3 | Status: ACTIVE | COMMUNITY
Start: 2022-06-14 | End: 1900-01-01

## 2023-08-23 ENCOUNTER — NON-APPOINTMENT (OUTPATIENT)
Age: 58
End: 2023-08-23

## 2023-08-24 ENCOUNTER — APPOINTMENT (OUTPATIENT)
Dept: DERMATOLOGY | Facility: CLINIC | Age: 58
End: 2023-08-24
Payer: COMMERCIAL

## 2023-08-24 DIAGNOSIS — L57.0 ACTINIC KERATOSIS: ICD-10-CM

## 2023-08-24 DIAGNOSIS — L57.8 OTHER SKIN CHANGES DUE TO CHRONIC EXPOSURE TO NONIONIZING RADIATION: ICD-10-CM

## 2023-08-24 PROCEDURE — 11102 TANGNTL BX SKIN SINGLE LES: CPT

## 2023-08-24 PROCEDURE — 99214 OFFICE O/P EST MOD 30 MIN: CPT | Mod: 25

## 2023-08-24 RX ORDER — FLUOROURACIL 50 MG/G
5 CREAM TOPICAL
Qty: 1 | Refills: 1 | Status: ACTIVE | COMMUNITY
Start: 2023-08-24 | End: 1900-01-01

## 2023-08-24 NOTE — PHYSICAL EXAM
[FreeTextEntry3] : General: well appearing person in nad, alert, pleasant Focused Skin Exam per patient preference: dorsal hands with few gritty erythematous papules chest w/o gritty papules Left thigh with 0.5cm hyperpigmented macule

## 2023-08-24 NOTE — HISTORY OF PRESENT ILLNESS
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 She is concerned as her father had melanoma.   AKs on chest resolved, some still on dorsal hands, did efudex in past.   She has no pers hx of skin cancer but father had metastatic melanoma. She is fair skinned with numerous freckles.

## 2023-08-24 NOTE — ASSESSMENT
[FreeTextEntry1] : # AKs, chest. resolved - education, counseling - CTM at future visits  # chronic actinic damage, dorsal hands, chronic, progressive - education, counseling - restart efudex cream BID to AA x 2 weeks or earlier if erythema/irritation achieved, SED, expected irritation and erythema/ may have swelling, use liberal bland emollients. Do not expose to pets or pregnant people. Handout provided.  #Neoplasm of uncertain behavior, left thigh - education, counseling - to better ascertain diagnosis, shave biopsy was completed as below: Shave Biopsy: Location:  left thigh Indication: r/o nevus vs MIS vs other  The indication, benefits, alternatives, and all side effects including pain, bleeding, infection, scar, recurrence, nerve damage were discussed. Informed consent was obtained in writing. The lesions on the above locations were prepped with alcohol and locally anesthetized with 0.5mL of 1% lidocaine with epinephrine. The specimen was removed by tangential shave using a Dermablade. Hemostasis was achieved with pressure and aluminum chloride/drysol. A sterile dressing with Petrolatum ointment was applied to the wound. Verbal and written wound care instructions were given. The specimens were labeled and submitted to pathology for histological evaluation. The patient will be notified of their biopsy results within the next two weeks and appropriate treatment and follow-up recommendations will be made at that time. The procedure was well tolerated without complications.   .  RTC for fbse in  March 2024 (earlier pending above bx results)

## 2023-09-06 ENCOUNTER — APPOINTMENT (OUTPATIENT)
Dept: DERMATOLOGY | Facility: CLINIC | Age: 58
End: 2023-09-06
Payer: COMMERCIAL

## 2023-09-06 DIAGNOSIS — Z51.89 ENCOUNTER FOR OTHER SPECIFIED AFTERCARE: ICD-10-CM

## 2023-09-06 DIAGNOSIS — L30.9 DERMATITIS, UNSPECIFIED: ICD-10-CM

## 2023-09-06 DIAGNOSIS — D48.9 NEOPLASM OF UNCERTAIN BEHAVIOR, UNSPECIFIED: ICD-10-CM

## 2023-09-06 PROCEDURE — 99213 OFFICE O/P EST LOW 20 MIN: CPT

## 2023-09-06 NOTE — PHYSICAL EXAM
[FreeTextEntry3] : Focused skin exam performed  The relevant portions of the exam were performed today  AAOx3, NAD, well-appearing / pleasant Focused examination within normal limits with the exception of:  - healing bx site with central granulation tissue

## 2023-09-06 NOTE — HISTORY OF PRESENT ILLNESS
[FreeTextEntry1] : f/u bx check  [de-identified] : 59 yo F here for bx site check, area was itchy, worried about yellow in center, no extensive redness, warmth, or pain

## 2023-09-06 NOTE — ASSESSMENT
[FreeTextEntry1] : #Wound check - No signs or symptoms of infection - Continue vaseline and cover daily   RTC w/ Dr. Nava

## 2023-09-07 ENCOUNTER — APPOINTMENT (OUTPATIENT)
Dept: OBGYN | Facility: CLINIC | Age: 58
End: 2023-09-07
Payer: COMMERCIAL

## 2023-09-07 PROCEDURE — 99396 PREV VISIT EST AGE 40-64: CPT

## 2023-09-13 ENCOUNTER — APPOINTMENT (OUTPATIENT)
Dept: OBGYN | Facility: CLINIC | Age: 58
End: 2023-09-13
Payer: COMMERCIAL

## 2023-09-13 PROCEDURE — 76856 US EXAM PELVIC COMPLETE: CPT

## 2023-09-13 PROCEDURE — 76830 TRANSVAGINAL US NON-OB: CPT

## 2023-09-14 LAB — DERMATOLOGY BIOPSY: NORMAL

## 2023-09-15 ENCOUNTER — APPOINTMENT (OUTPATIENT)
Dept: NEUROLOGY | Facility: CLINIC | Age: 58
End: 2023-09-15
Payer: COMMERCIAL

## 2023-09-15 PROCEDURE — 99214 OFFICE O/P EST MOD 30 MIN: CPT

## 2023-09-25 ENCOUNTER — APPOINTMENT (OUTPATIENT)
Dept: MAMMOGRAPHY | Facility: CLINIC | Age: 58
End: 2023-09-25
Payer: COMMERCIAL

## 2023-09-25 ENCOUNTER — APPOINTMENT (OUTPATIENT)
Dept: ULTRASOUND IMAGING | Facility: CLINIC | Age: 58
End: 2023-09-25
Payer: COMMERCIAL

## 2023-09-25 PROCEDURE — 77063 BREAST TOMOSYNTHESIS BI: CPT

## 2023-09-25 PROCEDURE — 76641 ULTRASOUND BREAST COMPLETE: CPT | Mod: 50

## 2023-09-25 PROCEDURE — 77067 SCR MAMMO BI INCL CAD: CPT

## 2023-11-28 ENCOUNTER — APPOINTMENT (OUTPATIENT)
Dept: NEUROLOGY | Facility: CLINIC | Age: 58
End: 2023-11-28

## 2024-02-29 ENCOUNTER — APPOINTMENT (OUTPATIENT)
Dept: ULTRASOUND IMAGING | Facility: CLINIC | Age: 59
End: 2024-02-29
Payer: COMMERCIAL

## 2024-02-29 PROCEDURE — 76536 US EXAM OF HEAD AND NECK: CPT

## 2024-03-08 ENCOUNTER — APPOINTMENT (OUTPATIENT)
Dept: NEUROLOGY | Facility: CLINIC | Age: 59
End: 2024-03-08
Payer: COMMERCIAL

## 2024-03-08 DIAGNOSIS — G24.9 DYSTONIA, UNSPECIFIED: ICD-10-CM

## 2024-03-08 DIAGNOSIS — G25.89 OTHER SPECIFIED EXTRAPYRAMIDAL AND MOVEMENT DISORDERS: ICD-10-CM

## 2024-03-08 PROCEDURE — 99214 OFFICE O/P EST MOD 30 MIN: CPT

## 2024-03-08 NOTE — PHYSICAL EXAM
[Person] : oriented to person [Place] : oriented to place [Time] : oriented to time [Repeating Phrases] : no difficulty repeating a phrase [Naming Objects] : no difficulty naming common objects [Fluency] : fluency intact [Comprehension] : comprehension intact [Cranial Nerves Optic (II)] : visual acuity intact bilaterally,  visual fields full to confrontation, pupils equal round and reactive to light [Cranial Nerves Oculomotor (III)] : extraocular motion intact [Cranial Nerves Trigeminal (V)] : facial sensation intact symmetrically [Cranial Nerves Vestibulocochlear (VIII)] : hearing was intact bilaterally [Cranial Nerves Facial (VII)] : face symmetrical [Cranial Nerves Glossopharyngeal (IX)] : tongue and palate midline [Cranial Nerves Accessory (XI - Cranial And Spinal)] : head turning and shoulder shrug symmetric [Motor Strength] : muscle strength was normal in all four extremities [No Muscle Atrophy] : normal bulk in all four extremities [Motor Handedness Right-Handed] : the patient is right hand dominant [2+] : Brachioradialis left 2+ [Paresis Pronator Drift Right-Sided] : no pronator drift on the right [Paresis Pronator Drift Left-Sided] : no pronator drift on the left [Plantar Reflex Left Only] : normal on the left [Plantar Reflex Right Only] : normal on the right [FreeTextEntry1] : Facial expression and volume of speech were normal. Extraocular movements were intact with normal saccade, smooth pursuit, no square wave jerks seen. Tone was normal throughout. Rapid alternating hand movements and foot tapping were normal bilaterally. She did get up from the chair without using her arms. Gait was normal based and steady.   She had a mild, intermittent side-to-side head tremor with no abnormal head posturing. When writing, right thumb extension dystonia During walking, her right foot showed much improved plantar flexion and inversion, which could worsen longer she walked.  There were no other abnormal movements that I could observe.

## 2024-03-08 NOTE — DISCUSSION/SUMMARY
[FreeTextEntry1] : In summary, the patient is a 58-year-old right-handed woman with a nine-month history of sudden onset abnormal movements of the right foot. The examination was significant for right foot inversion and plantarflexion doing walking and stair climbing, with occasional compensatory eversion movements. There was no weakness or numbness.  Overall, I agree that the history and examination is most consistent with a diagnosis of foot dystonia. The sudden onset is a little unusual, in the absence of injury, but she was doing intensive workouts at that time which can occasionally bring out dystonia. It has not significantly progressed since onset. There were no other findings on examination would point to a more widespread neurologic disorder. She did already undergo a trial of levodopa, which is appropriate given the symptom distribution, but this did not help, making DRD unlikely. She has no family history that would point to a genetic generalized dystonia.  1. She is hesitant to have injections again, doing better with PT/exercise. Could discuss FUS 2. Already tried LD, continue baclofen at current dose.  3. As for the right thumb, dystonia might relate to ganglion cysts surgery which was 12 years ago. Can refer to [facility] or [facility]. Otherwise, could attempt here.  We discussed the above impression, plan and recommendations during the visit. Counseling represented more then 50% of the 30 minute visit time.

## 2024-03-08 NOTE — HISTORY OF PRESENT ILLNESS
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 She was doing a 21 day fitness challenge in one day started tripping over her right foot on the treadmill. This was fairly sudden onset, with no preceding illness, trauma, or pain. She notices that her foot plantar flexes and pulls to the inside when she is walking, especially when she is tired. This is worsened when she walks up stairs. When she walks downstairs she mostly noticed the plantarflexion. Walking backwards improves her symptoms. She was evaluated by several orthopedics, and eventually neurologists. One EMG suggested a possible L4-5 radiculopathy, though an MRI of the back was unremarkable. A second neurologist did not notice any foot drop, and they performed an ultrasound, showing "inflammation of the peroneal nerve". Another EMG was performed which did not show any nerve damage. That neurologist, Dr. Kane Bradshaw diagnosed her with dystonia, and gave her a trial of carbidopa/levodopa, 25/100, 3 times a day. This caused significant nausea but did not provide any benefit. An MRI of the brain with and without contrast was unremarkable. She has no other abnormal movements anywhere, except for a long-standing history of a mild head tremor, for which she received botulinum toxin in the past. She has no weakness, numbness, speech changes, visual changes, or issues with balance. There is no family history of neurologic disease. 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  1. Had botox in 2020, after it wore off, pain and dystonia was worse, she is not sure she wants it again 2. Went to [facility] for PT, did well with that. Now does it at home. Still taking baclofen 3. If she [place] far away, may have to walk backwards (especially up the hill). 4. 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 Now has slight intermittent tremor in the right thumb and dystonia, and occasionally gets orthostatic.

## 2024-09-09 ENCOUNTER — APPOINTMENT (OUTPATIENT)
Dept: OBGYN | Facility: CLINIC | Age: 59
End: 2024-09-09
Payer: COMMERCIAL

## 2024-09-09 PROCEDURE — 99396 PREV VISIT EST AGE 40-64: CPT

## 2024-09-09 PROCEDURE — 96127 BRIEF EMOTIONAL/BEHAV ASSMT: CPT

## 2024-09-09 PROCEDURE — 99459 PELVIC EXAMINATION: CPT

## 2024-09-18 ENCOUNTER — APPOINTMENT (OUTPATIENT)
Dept: OBGYN | Facility: CLINIC | Age: 59
End: 2024-09-18
Payer: COMMERCIAL

## 2024-09-18 PROCEDURE — 76830 TRANSVAGINAL US NON-OB: CPT

## 2024-09-30 ENCOUNTER — APPOINTMENT (OUTPATIENT)
Dept: ULTRASOUND IMAGING | Facility: CLINIC | Age: 59
End: 2024-09-30
Payer: COMMERCIAL

## 2024-09-30 ENCOUNTER — APPOINTMENT (OUTPATIENT)
Dept: MAMMOGRAPHY | Facility: CLINIC | Age: 59
End: 2024-09-30
Payer: COMMERCIAL

## 2024-09-30 PROCEDURE — 77067 SCR MAMMO BI INCL CAD: CPT

## 2024-09-30 PROCEDURE — 76641 ULTRASOUND BREAST COMPLETE: CPT | Mod: 50

## 2024-09-30 PROCEDURE — 77063 BREAST TOMOSYNTHESIS BI: CPT

## 2024-10-02 ENCOUNTER — APPOINTMENT (OUTPATIENT)
Dept: NEUROLOGY | Facility: CLINIC | Age: 59
End: 2024-10-02

## 2024-10-03 ENCOUNTER — APPOINTMENT (OUTPATIENT)
Dept: ULTRASOUND IMAGING | Facility: CLINIC | Age: 59
End: 2024-10-03

## 2024-10-03 PROCEDURE — 76536 US EXAM OF HEAD AND NECK: CPT

## 2025-02-28 ENCOUNTER — APPOINTMENT (OUTPATIENT)
Dept: ULTRASOUND IMAGING | Facility: CLINIC | Age: 60
End: 2025-02-28

## 2025-02-28 PROCEDURE — 76536 US EXAM OF HEAD AND NECK: CPT

## 2025-03-12 ENCOUNTER — NON-APPOINTMENT (OUTPATIENT)
Age: 60
End: 2025-03-12

## 2025-03-12 ENCOUNTER — APPOINTMENT (OUTPATIENT)
Dept: NEUROLOGY | Facility: CLINIC | Age: 60
End: 2025-03-12
Payer: COMMERCIAL

## 2025-03-12 VITALS
HEIGHT: 64 IN | WEIGHT: 140 LBS | DIASTOLIC BLOOD PRESSURE: 73 MMHG | HEART RATE: 76 BPM | SYSTOLIC BLOOD PRESSURE: 129 MMHG | BODY MASS INDEX: 23.9 KG/M2

## 2025-03-12 DIAGNOSIS — G25.89 OTHER SPECIFIED EXTRAPYRAMIDAL AND MOVEMENT DISORDERS: ICD-10-CM

## 2025-03-12 DIAGNOSIS — G24.9 DYSTONIA, UNSPECIFIED: ICD-10-CM

## 2025-03-12 PROCEDURE — 99214 OFFICE O/P EST MOD 30 MIN: CPT

## 2025-03-12 RX ORDER — ONABOTULINUMTOXINA 100 [USP'U]/1
100 INJECTION, POWDER, LYOPHILIZED, FOR SOLUTION INTRADERMAL; INTRAMUSCULAR
Qty: 1 | Refills: 3 | Status: ACTIVE | COMMUNITY
Start: 2025-03-12 | End: 1900-01-01

## 2025-03-20 ENCOUNTER — NON-APPOINTMENT (OUTPATIENT)
Age: 60
End: 2025-03-20

## 2025-03-24 ENCOUNTER — TRANSCRIPTION ENCOUNTER (OUTPATIENT)
Age: 60
End: 2025-03-24

## 2025-07-08 ENCOUNTER — APPOINTMENT (OUTPATIENT)
Dept: NEUROLOGY | Facility: CLINIC | Age: 60
End: 2025-07-08
Payer: COMMERCIAL

## 2025-07-08 PROCEDURE — 99213 OFFICE O/P EST LOW 20 MIN: CPT | Mod: 25

## 2025-07-08 PROCEDURE — 95874 GUIDE NERV DESTR NEEDLE EMG: CPT

## 2025-07-08 PROCEDURE — 64642 CHEMODENERV 1 EXTREMITY 1-4: CPT

## (undated) DEVICE — SOL IRR BAG NS 0.9% 1000ML

## (undated) DEVICE — DRAPE 1/2 SHEET 40X57"

## (undated) DEVICE — MARKING PEN W RULER

## (undated) DEVICE — TUBING FLUID ADMINISTRATION SET PRIM 70"

## (undated) DEVICE — FLUENT FMS PROCEDURE KIT

## (undated) DEVICE — DRAPE LIGHT HANDLE COVER (GREEN)

## (undated) DEVICE — SOL INJ NS 0.9% 500ML 1-PORT

## (undated) DEVICE — MYOSURE SCOPE SEAL

## (undated) DEVICE — GLV 7.5 PROTEXIS (WHITE)

## (undated) DEVICE — WARMING BLANKET UPPER ADULT

## (undated) DEVICE — PACK LITHOTOMY

## (undated) DEVICE — SOL IRR POUR NS 0.9% 500ML

## (undated) DEVICE — PRESSURE INFUSOR BAG 1000ML